# Patient Record
Sex: MALE | Race: WHITE | Employment: OTHER | URBAN - METROPOLITAN AREA
[De-identification: names, ages, dates, MRNs, and addresses within clinical notes are randomized per-mention and may not be internally consistent; named-entity substitution may affect disease eponyms.]

---

## 2017-01-17 PROBLEM — R97.20 ELEVATED PSA: Status: ACTIVE | Noted: 2017-01-17

## 2017-02-13 ENCOUNTER — HOSPITAL ENCOUNTER (OUTPATIENT)
Dept: LAB | Age: 67
Discharge: HOME | End: 2017-02-13

## 2017-02-13 PROCEDURE — 88305 TISSUE EXAM BY PATHOLOGIST: CPT | Performed by: UROLOGY

## 2017-09-13 ENCOUNTER — HOSPITAL ENCOUNTER (EMERGENCY)
Age: 67
Discharge: HOME | End: 2017-09-13
Attending: EMERGENCY MEDICINE
Payer: COMMERCIAL

## 2017-09-13 ENCOUNTER — HOSPITAL ENCOUNTER (OUTPATIENT)
Dept: MRI IMAGING | Age: 67
Discharge: HOME | End: 2017-09-13
Attending: UROLOGY
Payer: COMMERCIAL

## 2017-09-13 VITALS
HEART RATE: 70 BPM | TEMPERATURE: 97.7 F | SYSTOLIC BLOOD PRESSURE: 125 MMHG | RESPIRATION RATE: 20 BRPM | OXYGEN SATURATION: 98 % | DIASTOLIC BLOOD PRESSURE: 73 MMHG | HEIGHT: 70 IN | WEIGHT: 180 LBS | BODY MASS INDEX: 25.77 KG/M2

## 2017-09-13 DIAGNOSIS — R55 NEAR SYNCOPE: Primary | ICD-10-CM

## 2017-09-13 DIAGNOSIS — R97.20 ELEVATED PSA: ICD-10-CM

## 2017-09-13 LAB
ALBUMIN SERPL-MCNC: 3.4 G/DL (ref 3.2–4.6)
ALBUMIN/GLOB SERPL: 0.9 {RATIO} (ref 1.2–3.5)
ALP SERPL-CCNC: 50 U/L (ref 50–136)
ALT SERPL-CCNC: 21 U/L (ref 12–65)
ANION GAP SERPL CALC-SCNC: 5 MMOL/L (ref 7–16)
AST SERPL-CCNC: 17 U/L (ref 15–37)
ATRIAL RATE: 49 BPM
ATRIAL RATE: 51 BPM
BASOPHILS # BLD: 0 K/UL (ref 0–0.2)
BASOPHILS NFR BLD: 0 % (ref 0–2)
BILIRUB SERPL-MCNC: 0.5 MG/DL (ref 0.2–1.1)
BUN SERPL-MCNC: 14 MG/DL (ref 8–23)
CALCIUM SERPL-MCNC: 8.8 MG/DL (ref 8.3–10.4)
CALCULATED P AXIS, ECG09: 65 DEGREES
CALCULATED P AXIS, ECG09: 69 DEGREES
CALCULATED R AXIS, ECG10: 58 DEGREES
CALCULATED R AXIS, ECG10: 63 DEGREES
CALCULATED T AXIS, ECG11: 49 DEGREES
CALCULATED T AXIS, ECG11: 56 DEGREES
CHLORIDE SERPL-SCNC: 105 MMOL/L (ref 98–107)
CO2 SERPL-SCNC: 29 MMOL/L (ref 21–32)
CREAT BLD-MCNC: 0.9 MG/DL (ref 0.8–1.5)
CREAT SERPL-MCNC: 0.86 MG/DL (ref 0.8–1.5)
DIAGNOSIS, 93000: NORMAL
DIAGNOSIS, 93000: NORMAL
DIFFERENTIAL METHOD BLD: ABNORMAL
EOSINOPHIL # BLD: 0.1 K/UL (ref 0–0.8)
EOSINOPHIL NFR BLD: 2 % (ref 0.5–7.8)
ERYTHROCYTE [DISTWIDTH] IN BLOOD BY AUTOMATED COUNT: 13.1 % (ref 11.9–14.6)
GLOBULIN SER CALC-MCNC: 3.9 G/DL (ref 2.3–3.5)
GLUCOSE BLD STRIP.AUTO-MCNC: 80 MG/DL (ref 65–100)
GLUCOSE SERPL-MCNC: 90 MG/DL (ref 65–100)
HCT VFR BLD AUTO: 41.2 % (ref 41.1–50.3)
HGB BLD-MCNC: 14.3 G/DL (ref 13.6–17.2)
IMM GRANULOCYTES # BLD: 0 K/UL (ref 0–0.5)
IMM GRANULOCYTES NFR BLD: 0.2 % (ref 0–5)
LYMPHOCYTES # BLD: 1.7 K/UL (ref 0.5–4.6)
LYMPHOCYTES NFR BLD: 31 % (ref 13–44)
MCH RBC QN AUTO: 31.7 PG (ref 26.1–32.9)
MCHC RBC AUTO-ENTMCNC: 34.7 G/DL (ref 31.4–35)
MCV RBC AUTO: 91.4 FL (ref 79.6–97.8)
MONOCYTES # BLD: 0.3 K/UL (ref 0.1–1.3)
MONOCYTES NFR BLD: 5 % (ref 4–12)
NEUTS SEG # BLD: 3.4 K/UL (ref 1.7–8.2)
NEUTS SEG NFR BLD: 62 % (ref 43–78)
P-R INTERVAL, ECG05: 172 MS
P-R INTERVAL, ECG05: 178 MS
PLATELET # BLD AUTO: 209 K/UL (ref 150–450)
PMV BLD AUTO: 9.1 FL (ref 10.8–14.1)
POTASSIUM SERPL-SCNC: 4.6 MMOL/L (ref 3.5–5.1)
PROT SERPL-MCNC: 7.3 G/DL (ref 6.3–8.2)
Q-T INTERVAL, ECG07: 448 MS
Q-T INTERVAL, ECG07: 472 MS
QRS DURATION, ECG06: 102 MS
QRS DURATION, ECG06: 108 MS
QTC CALCULATION (BEZET), ECG08: 412 MS
QTC CALCULATION (BEZET), ECG08: 426 MS
RBC # BLD AUTO: 4.51 M/UL (ref 4.23–5.67)
SODIUM SERPL-SCNC: 139 MMOL/L (ref 136–145)
TROPONIN I BLD-MCNC: 0 NG/ML (ref 0.02–0.05)
VENTRICULAR RATE, ECG03: 49 BPM
VENTRICULAR RATE, ECG03: 51 BPM
WBC # BLD AUTO: 5.5 K/UL (ref 4.3–11.1)

## 2017-09-13 PROCEDURE — 82962 GLUCOSE BLOOD TEST: CPT

## 2017-09-13 PROCEDURE — 82565 ASSAY OF CREATININE: CPT

## 2017-09-13 PROCEDURE — 93005 ELECTROCARDIOGRAM TRACING: CPT | Performed by: EMERGENCY MEDICINE

## 2017-09-13 PROCEDURE — 93005 ELECTROCARDIOGRAM TRACING: CPT | Performed by: UROLOGY

## 2017-09-13 PROCEDURE — A9577 INJ MULTIHANCE: HCPCS | Performed by: UROLOGY

## 2017-09-13 PROCEDURE — 84484 ASSAY OF TROPONIN QUANT: CPT

## 2017-09-13 PROCEDURE — 99284 EMERGENCY DEPT VISIT MOD MDM: CPT | Performed by: EMERGENCY MEDICINE

## 2017-09-13 PROCEDURE — 80053 COMPREHEN METABOLIC PANEL: CPT | Performed by: EMERGENCY MEDICINE

## 2017-09-13 PROCEDURE — 85025 COMPLETE CBC W/AUTO DIFF WBC: CPT | Performed by: EMERGENCY MEDICINE

## 2017-09-13 PROCEDURE — 74011000258 HC RX REV CODE- 258: Performed by: UROLOGY

## 2017-09-13 PROCEDURE — 74011250636 HC RX REV CODE- 250/636: Performed by: UROLOGY

## 2017-09-13 PROCEDURE — 72197 MRI PELVIS W/O & W/DYE: CPT

## 2017-09-13 RX ORDER — SODIUM CHLORIDE 0.9 % (FLUSH) 0.9 %
10 SYRINGE (ML) INJECTION
Status: COMPLETED | OUTPATIENT
Start: 2017-09-13 | End: 2017-09-13

## 2017-09-13 RX ADMIN — Medication 10 ML: at 09:39

## 2017-09-13 RX ADMIN — SODIUM CHLORIDE 100 ML: 900 INJECTION, SOLUTION INTRAVENOUS at 09:39

## 2017-09-13 RX ADMIN — GADOBENATE DIMEGLUMINE 10 ML: 529 INJECTION, SOLUTION INTRAVENOUS at 09:39

## 2017-09-13 NOTE — ED TRIAGE NOTES
Pt was having an outpatient mri performed, test was completed and pt was sitting in chair. Pt recalls \" I could feel each pulse and my head felt tight\" , MRI staff report syncope and a code was called in MRI,  Upon arrival to MRI, pt was pale and diaphoretic. Pt denied pain. Orthostatics performed, ekg and glucose. Glucose 80.

## 2017-09-13 NOTE — DISCHARGE INSTRUCTIONS
Lightheadedness or Faintness: Care Instructions  Your Care Instructions  Lightheadedness is a feeling that you are about to faint or \"pass out. \" You do not feel as if you or your surroundings are moving. It is different from vertigo, which is the feeling that you or things around you are spinning or tilting. Lightheadedness usually goes away or gets better when you lie down. If lightheadedness gets worse, it can lead to a fainting spell. It is common to feel lightheaded from time to time. Lightheadedness usually is not caused by a serious problem. It often is caused by a short-lasting drop in blood pressure and blood flow to your head that occurs when you get up too quickly from a seated or lying position. Follow-up care is a key part of your treatment and safety. Be sure to make and go to all appointments, and call your doctor if you are having problems. It's also a good idea to know your test results and keep a list of the medicines you take. How can you care for yourself at home? · Lie down for 1 or 2 minutes when you feel lightheaded. After lying down, sit up slowly and remain sitting for 1 to 2 minutes before slowly standing up. · Avoid movements, positions, or activities that have made you lightheaded in the past.  · Get plenty of rest, especially if you have a cold or flu, which can cause lightheadedness. · Make sure you drink plenty of fluids, especially if you have a fever or have been sweating. · Do not drive or put yourself and others in danger while you feel lightheaded. When should you call for help? Call 911 anytime you think you may need emergency care. For example, call if:  · You have symptoms of a stroke. These may include:  ¨ Sudden numbness, tingling, weakness, or loss of movement in your face, arm, or leg, especially on only one side of your body. ¨ Sudden vision changes. ¨ Sudden trouble speaking. ¨ Sudden confusion or trouble understanding simple statements.   ¨ Sudden problems with walking or balance. ¨ A sudden, severe headache that is different from past headaches. · You have symptoms of a heart attack. These may include:  ¨ Chest pain or pressure, or a strange feeling in the chest.  ¨ Sweating. ¨ Shortness of breath. ¨ Nausea or vomiting. ¨ Pain, pressure, or a strange feeling in the back, neck, jaw, or upper belly or in one or both shoulders or arms. ¨ Lightheadedness or sudden weakness. ¨ A fast or irregular heartbeat. After you call 911, the  may tell you to chew 1 adult-strength or 2 to 4 low-dose aspirin. Wait for an ambulance. Do not try to drive yourself. Watch closely for changes in your health, and be sure to contact your doctor if:  · Your lightheadedness gets worse or does not get better with home care. Where can you learn more? Go to http://diogo-jerrell.info/. Enter J620 in the search box to learn more about \"Lightheadedness or Faintness: Care Instructions. \"  Current as of: March 20, 2017  Content Version: 11.3  © 7071-1082 [employer]. Care instructions adapted under license by [employer] (which disclaims liability or warranty for this information). If you have questions about a medical condition or this instruction, always ask your healthcare professional. [address] any warranty or liability for your use of this information.

## 2017-09-13 NOTE — ED PROVIDER NOTES
HPI Comments: Patient is being worked up for an elevated PSA. He was getting a MRI this morning outpatient with contrast and after the procedure while sitting in a chair getting dressed and started to feel lightheaded and weak. He then became poorly responsive with the techs there. Staff reports patient had a syncopal episode but did not fall out of his chair and a code was called and MRI. Patient was pale and diaphoretic with them. Denies any chest pain or shortness of breath. Sent here for further evaluation. Patient feeling better here with rest and fluids. Has not eaten anything this morning. Patient is a 74 y.o. male presenting with syncope. The history is provided by the patient. No  was used. Syncope    This is a new problem. The current episode started less than 1 hour ago. The problem has been resolved. He lost consciousness for a period of less than one minute. The problem is associated with sitting up. Associated symptoms include visual change and light-headedness. Pertinent negatives include no chest pain, no palpitations, no confusion, no fever, no malaise/fatigue, no abdominal pain, no bowel incontinence, no nausea, no vomiting, no bladder incontinence, no congestion, no headaches, no back pain, no focal weakness, no seizures, no slurred speech and no weakness. He has tried nothing for the symptoms. His past medical history is significant for syncope. Past Medical History:   Diagnosis Date    Elevated PSA        Past Surgical History:   Procedure Laterality Date    HX BACK SURGERY           Family History:   Problem Relation Age of Onset    Cancer Paternal Aunt        Social History     Social History    Marital status:      Spouse name: N/A    Number of children: N/A    Years of education: N/A     Occupational History    Not on file.      Social History Main Topics    Smoking status: Never Smoker    Smokeless tobacco: Never Used    Alcohol use No  Drug use: No    Sexual activity: Not on file     Other Topics Concern    Not on file     Social History Narrative         ALLERGIES: Codeine    Review of Systems   Constitutional: Negative for chills, fever and malaise/fatigue. HENT: Negative for congestion, rhinorrhea and sore throat. Eyes: Negative for pain and redness. Respiratory: Negative for chest tightness, shortness of breath and wheezing. Cardiovascular: Positive for syncope. Negative for chest pain, palpitations and leg swelling. Gastrointestinal: Negative for abdominal pain, bowel incontinence, diarrhea, nausea and vomiting. Genitourinary: Negative for bladder incontinence, dysuria and hematuria. Musculoskeletal: Negative for back pain, gait problem, neck pain and neck stiffness. Skin: Negative for color change and rash. Neurological: Positive for syncope and light-headedness. Negative for focal weakness, seizures, weakness, numbness and headaches. Psychiatric/Behavioral: Negative for confusion. Vitals:    09/13/17 1011 09/13/17 1012 09/13/17 1028 09/13/17 1043   BP: 124/66 124/68 127/72 126/73   Pulse: (!) 53 (!) 52 (!) 56 (!) 58   Resp: 12 16 23 19   Temp:       SpO2: 99% 97%     Weight:       Height:                Physical Exam   Constitutional: He is oriented to person, place, and time. He appears well-developed and well-nourished. HENT:   Head: Normocephalic and atraumatic. Neck: Normal range of motion. Neck supple. Cardiovascular: Regular rhythm. Bradycardia present. No murmur heard. Pulmonary/Chest: Effort normal and breath sounds normal. He has no wheezes. Abdominal: Soft. Bowel sounds are normal. There is no tenderness. Musculoskeletal: Normal range of motion. He exhibits no edema. Neurological: He is alert and oriented to person, place, and time. No cranial nerve deficit. He exhibits normal muscle tone. Coordination normal.   Skin: Skin is warm and dry. Nursing note and vitals reviewed. MDM  Number of Diagnoses or Management Options  Diagnosis management comments: Pt feels good. Will discharge. Amount and/or Complexity of Data Reviewed  Clinical lab tests: ordered and reviewed  Tests in the radiology section of CPT®: ordered and reviewed  Tests in the medicine section of CPT®: ordered and reviewed    Patient Progress  Patient progress: stable    ED Course       Procedures      EKG: nonspecific ST and T waves changes, sinus bradycardia. Rate 51.            Results Include:    Recent Results (from the past 24 hour(s))   MRI/CT POC CREATININE    Collection Time: 09/13/17  9:09 AM   Result Value Ref Range    Creatinine (POC) 0.9 0.8 - 1.5 MG/DL    GFRAA, POC >60 >60 ml/min/1.73m2    GFRNA, POC >60 >60 ml/min/1.73m2   EKG, 12 LEAD, INITIAL    Collection Time: 09/13/17  9:53 AM   Result Value Ref Range    Ventricular Rate 49 BPM    Atrial Rate 49 BPM    P-R Interval 172 ms    QRS Duration 108 ms    Q-T Interval 472 ms    QTC Calculation (Bezet) 426 ms    Calculated P Axis 69 degrees    Calculated R Axis 58 degrees    Calculated T Axis 49 degrees    Diagnosis       Marked sinus bradycardia with sinus arrhythmia  Abnormal ECG  When compared with ECG of 16-AUG-2012 14:55,  Left anterior fascicular block is no longer Present  Non-specific change in ST segment in Inferior leads  Nonspecific T wave abnormality no longer evident in Inferior leads  Confirmed by Padmini Yao MD (), SHERON HICKS ([id number]) on 9/13/2017 10:51:54 AM     GLUCOSE, POC    Collection Time: 09/13/17  9:59 AM   Result Value Ref Range    Glucose (POC) 80 65 - 100 mg/dL   EKG, 12 LEAD, INITIAL    Collection Time: 09/13/17 10:13 AM   Result Value Ref Range    Ventricular Rate 51 BPM    Atrial Rate 51 BPM    P-R Interval 178 ms    QRS Duration 102 ms    Q-T Interval 448 ms    QTC Calculation (Bezet) 412 ms    Calculated P Axis 65 degrees    Calculated R Axis 63 degrees    Calculated T Axis 56 degrees    Diagnosis       !! AGE AND GENDER SPECIFIC ECG ANALYSIS !! Sinus bradycardia  Otherwise normal ECG  When compared with ECG of 13-SEP-2017 09:53,  No significant change was found  Confirmed by [facility] SHAILA LANGE (), SHERON HICKS ([id number]) on 9/13/2017 10:52:25 AM     CBC WITH AUTOMATED DIFF    Collection Time: 09/13/17 10:28 AM   Result Value Ref Range    WBC 5.5 4.3 - 11.1 K/uL    RBC 4.51 4.23 - 5.67 M/uL    HGB 14.3 13.6 - 17.2 g/dL    HCT 41.2 41.1 - 50.3 %    MCV 91.4 79.6 - 97.8 FL    MCH 31.7 26.1 - 32.9 PG    MCHC 34.7 31.4 - 35.0 g/dL    RDW 13.1 11.9 - 14.6 %    PLATELET 471 807 - 305 K/uL    MPV 9.1 (L) 10.8 - 14.1 FL    DF AUTOMATED      NEUTROPHILS 62 43 - 78 %    LYMPHOCYTES 31 13 - 44 %    MONOCYTES 5 4.0 - 12.0 %    EOSINOPHILS 2 0.5 - 7.8 %    BASOPHILS 0 0.0 - 2.0 %    IMMATURE GRANULOCYTES 0.2 0.0 - 5.0 %    ABS. NEUTROPHILS 3.4 1.7 - 8.2 K/UL    ABS. LYMPHOCYTES 1.7 0.5 - 4.6 K/UL    ABS. MONOCYTES 0.3 0.1 - 1.3 K/UL    ABS. EOSINOPHILS 0.1 0.0 - 0.8 K/UL    ABS. BASOPHILS 0.0 0.0 - 0.2 K/UL    ABS. IMM. GRANS. 0.0 0.0 - 0.5 K/UL   POC TROPONIN-I    Collection Time: 09/13/17 10:29 AM   Result Value Ref Range    Troponin-I (POC) 0 (L) 0.02 - 9.96 ng/ml   METABOLIC PANEL, COMPREHENSIVE    Collection Time: 09/13/17 11:21 AM   Result Value Ref Range    Sodium 139 136 - 145 mmol/L    Potassium 4.6 3.5 - 5.1 mmol/L    Chloride 105 98 - 107 mmol/L    CO2 29 21 - 32 mmol/L    Anion gap 5 (L) 7 - 16 mmol/L    Glucose 90 65 - 100 mg/dL    BUN 14 8 - 23 MG/DL    Creatinine 0.86 0.8 - 1.5 MG/DL    GFR est AA >60 >60 ml/min/1.73m2    GFR est non-AA >60 >60 ml/min/1.73m2    Calcium 8.8 8.3 - 10.4 MG/DL    Bilirubin, total 0.5 0.2 - 1.1 MG/DL    ALT (SGPT) 21 12 - 65 U/L    AST (SGOT) 17 15 - 37 U/L    Alk.  phosphatase 50 50 - 136 U/L    Protein, total 7.3 6.3 - 8.2 g/dL    Albumin 3.4 3.2 - 4.6 g/dL    Globulin 3.9 (H) 2.3 - 3.5 g/dL    A-G Ratio 0.9 (L) 1.2 - 3.5

## 2017-09-13 NOTE — PROGRESS NOTES
Responded to Code Blue. Patient did not lose pulse nor stop breathing. States he became lightheaded and dizzy while obtaining outpatient MRI for elevated PSA. Drove himself to appointment. Has had allergies to iodine but never has received gadolinium before. Diaphoretic upon arrival but color improved and became asymptomatic during evaluation. Seems to have vasovagaled. sbp in 120s. EKG shows sinus bradycardia into 50s, patient states this isn't new to him. Encouraged to go to ED for observation for a few hours and maybe some IVF.      Gume Sofia MD

## 2017-12-01 PROBLEM — N40.0 BENIGN PROSTATIC HYPERPLASIA WITHOUT LOWER URINARY TRACT SYMPTOMS: Status: ACTIVE | Noted: 2017-12-01

## 2022-03-18 PROBLEM — R97.20 ELEVATED PSA: Status: ACTIVE | Noted: 2017-01-17

## 2022-03-19 PROBLEM — N40.0 BENIGN PROSTATIC HYPERPLASIA WITHOUT LOWER URINARY TRACT SYMPTOMS: Status: ACTIVE | Noted: 2017-12-01

## 2022-06-20 ENCOUNTER — NURSE ONLY (OUTPATIENT)
Dept: UROLOGY | Age: 72
End: 2022-06-20

## 2022-06-20 DIAGNOSIS — R97.20 BPH WITH ELEVATED PSA: Primary | ICD-10-CM

## 2022-06-20 DIAGNOSIS — N40.0 BPH WITH ELEVATED PSA: ICD-10-CM

## 2022-06-20 DIAGNOSIS — R97.20 BPH WITH ELEVATED PSA: ICD-10-CM

## 2022-06-20 DIAGNOSIS — N40.0 BPH WITH ELEVATED PSA: Primary | ICD-10-CM

## 2022-06-21 LAB
PSA FREE MFR SERPL: 18.4 %
PSA FREE SERPL-MCNC: 1.8 NG/ML
PSA SERPL-MCNC: 9.8 NG/ML

## 2022-06-24 ASSESSMENT — ENCOUNTER SYMPTOMS
BACK PAIN: 0
NAUSEA: 0

## 2022-06-24 NOTE — PROGRESS NOTES
[facility]  [address]  [address]  [facility], [address] [facility]  [phone]    Enriqueta Gaffney  : 1950    Chief Complaint   Patient presents with    Follow-up        HPI     Enriqueta Gaffney is a 81 y.o. male with elevated PSA. The result was a PSA level of 6.21 ng/ml. \": PSA was 8.98 when repeated in .     Had prostate biopsy 17. PROSTATE VOLUME: 64.67 gm  PSA 8.98 PSAD 0.14  Pathology: All biopsies were benign.       Total PSA 13.3 with 13.8% free PSA in .      Had MRI pelvis 17: IMPRESSION:  1. Nonspecific enhancing wedge-shaped decreased T2 intensity lateral right mid  gland without diffusion abnormality. No evidence of disease spread beyond the  prostatic capsule. 2. No lymphadenopathy. 3. Decreased T2 signal surrounding each ejaculatory duct orifice is nonspecific  and may represent sequelae of prior infection. 4. BPH centrally.   prostate approx 75 gm by MRI. He had some type of reaction after the IV contrast. Felt warm and passed out. No problems breathing .   PCA3 score was 8. Gives 8% chance of positive biopsy.    PSA 9.2 in -. PSA 7.7 in -18. In -, total PSA 11.4 with 16.6% free PSA.     Gave round of Cipro.    PSA 9.1 in -. PSA is back to level it was in 2016. We discussed doing repeat biopsies w or wo MRI. He doesn't want to have MRI because of the reaction that he had. I don't think had a true contrast allergy.   PSA 8.3 in -. PSA 5.9 in ,  7.2 in . He has no significant voiding complaints.   In -, total PSA 7.4 with 26.4% free PSA.   PSA 9.7 in -. In -, total PSA 9.8 with 18.4% free PSA.     Past Medical History:   Diagnosis Date    Elevated PSA      Past Surgical History:   Procedure Laterality Date    BACK SURGERY       Current Outpatient Medications   Medication Sig Dispense Refill    losartan-hydroCHLOROthiazide (HYZAAR) 100-25 MG per tablet TAKE 1 TABLET BY MOUTH EVERY DAY      rosuvastatin (CRESTOR) 20 MG tablet Take 20 mg by mouth daily       No current facility-administered medications for this visit. Allergies   Allergen Reactions    Codeine Other (See Comments)     Social History     Socioeconomic History    Marital status:      Spouse name: Not on file    Number of children: Not on file    Years of education: Not on file    Highest education level: Not on file   Occupational History    Not on file   Tobacco Use    Smoking status: Never Smoker    Smokeless tobacco: Never Used   Substance and Sexual Activity    Alcohol use: No    Drug use: No    Sexual activity: Not on file   Other Topics Concern    Not on file   Social History Narrative    Not on file     Social Determinants of Health     Financial Resource Strain:     Difficulty of Paying Living Expenses: Not on file   Food Insecurity:     Worried About Running Out of Food in the Last Year: Not on file    Annie of Food in the Last Year: Not on file   Transportation Needs:     Lack of Transportation (Medical): Not on file    Lack of Transportation (Non-Medical):  Not on file   Physical Activity:     Days of Exercise per Week: Not on file    Minutes of Exercise per Session: Not on file   Stress:     Feeling of Stress : Not on file   Social Connections:     Frequency of Communication with Friends and Family: Not on file    Frequency of Social Gatherings with Friends and Family: Not on file    Attends Druze Services: Not on file    Active Member of Clubs or Organizations: Not on file    Attends Club or Organization Meetings: Not on file    Marital Status: Not on file   Intimate Partner Violence:     Fear of Current or Ex-Partner: Not on file    Emotionally Abused: Not on file    Physically Abused: Not on file    Sexually Abused: Not on file   Housing Stability:     Unable to Pay for Housing in the Last Year: Not on file    Number of Places Lived in the Last Year: Not on file    Unstable Housing in the Last Year: Not on file     Family History   Problem Relation Age of Onset    Cancer Paternal Aunt        Review of Systems  Constitutional:   Negative for fever. Cardiovascular:  Negative for chest pain. GI:  Negative for nausea. Genitourinary:  Negative for urinary burning. Musculoskeletal:  Negative for back pain. There were no vitals taken for this visit. Urinalysis  UA - Dipstick  Results for orders placed or performed in visit on 06/27/22   AMB POC URINALYSIS DIP STICK AUTO W/O MICRO   Result Value Ref Range    Color (UA POC)      Clarity (UA POC)      Glucose, Urine, POC Negative Negative    Bilirubin, Urine, POC Negative Negative    KETONES, Urine, POC Negative Negative    Specific Gravity, Urine, POC 1.015 1.001 - 1.035    Blood (UA POC) Trace-intact Negative    pH, Urine, POC 6.0 4.6 - 8.0    Protein, Urine, POC Negative Negative    Urobilinogen, POC Normal     Nitrite, Urine, POC Negative Negative    Leukocyte Esterase, Urine, POC Negative Negative       UA - Micro  WBC - 0  RBC - 0  Bacteria - 0  Epith - 0    Physical Exam    Assessment and Plan    ICD-10-CM    1. BPH with elevated PSA  N40.0 AMB POC URINALYSIS DIP STICK AUTO W/O MICRO    R97.20 PSA, Total and Free       Orders Placed This Encounter   Procedures    PSA, Total and Free     Standing Status:   Future     Standing Expiration Date:   6/27/2023    AMB POC URINALYSIS DIP STICK AUTO W/O MICRO     PSA elevated , but stable over last 6 months. Won't consent to MRI because of previous reaction. Will follow PSA. Follow-up and Dispositions    · Return in about 6 months (around 12/27/2022) for PSA II.     YOBANI then.

## 2022-06-27 ENCOUNTER — OFFICE VISIT (OUTPATIENT)
Dept: UROLOGY | Age: 72
End: 2022-06-27
Payer: COMMERCIAL

## 2022-06-27 DIAGNOSIS — N40.0 BPH WITH ELEVATED PSA: Primary | ICD-10-CM

## 2022-06-27 DIAGNOSIS — R97.20 BPH WITH ELEVATED PSA: Primary | ICD-10-CM

## 2022-06-27 LAB
BILIRUBIN, URINE, POC: NEGATIVE
BLOOD URINE, POC: NORMAL
GLUCOSE URINE, POC: NEGATIVE
KETONES, URINE, POC: NEGATIVE
LEUKOCYTE ESTERASE, URINE, POC: NEGATIVE
NITRITE, URINE, POC: NEGATIVE
PH, URINE, POC: 6 (ref 4.6–8)
PROTEIN,URINE, POC: NEGATIVE
SPECIFIC GRAVITY, URINE, POC: 1.01 (ref 1–1.03)
URINALYSIS CLARITY, POC: NORMAL
URINALYSIS COLOR, POC: NORMAL
UROBILINOGEN, POC: NORMAL

## 2022-06-27 PROCEDURE — 99213 OFFICE O/P EST LOW 20 MIN: CPT | Performed by: UROLOGY

## 2022-06-27 PROCEDURE — 81003 URINALYSIS AUTO W/O SCOPE: CPT | Performed by: UROLOGY

## 2022-06-27 PROCEDURE — 1123F ACP DISCUSS/DSCN MKR DOCD: CPT | Performed by: UROLOGY

## 2022-12-23 DIAGNOSIS — R97.20 BPH WITH ELEVATED PSA: ICD-10-CM

## 2022-12-23 DIAGNOSIS — N40.0 BPH WITH ELEVATED PSA: ICD-10-CM

## 2022-12-26 LAB
PSA FREE MFR SERPL: 24 %
PSA FREE SERPL-MCNC: 3.6 NG/ML
PSA SERPL-MCNC: 15 NG/ML

## 2022-12-30 ENCOUNTER — OFFICE VISIT (OUTPATIENT)
Dept: UROLOGY | Age: 72
End: 2022-12-30

## 2022-12-30 DIAGNOSIS — R97.20 BPH WITH ELEVATED PSA: Primary | ICD-10-CM

## 2022-12-30 DIAGNOSIS — N40.0 BPH WITH ELEVATED PSA: Primary | ICD-10-CM

## 2022-12-30 LAB
BILIRUBIN, URINE, POC: NEGATIVE
BLOOD URINE, POC: NORMAL
GLUCOSE URINE, POC: NEGATIVE
KETONES, URINE, POC: NEGATIVE
LEUKOCYTE ESTERASE, URINE, POC: NEGATIVE
NITRITE, URINE, POC: NEGATIVE
PH, URINE, POC: 7 (ref 4.6–8)
PROTEIN,URINE, POC: NEGATIVE
SPECIFIC GRAVITY, URINE, POC: 1.01 (ref 1–1.03)
URINALYSIS CLARITY, POC: NORMAL
URINALYSIS COLOR, POC: NORMAL
UROBILINOGEN, POC: NORMAL

## 2022-12-30 RX ORDER — MELOXICAM 15 MG/1
15 TABLET ORAL DAILY
COMMUNITY
Start: 2022-01-15 | End: 2023-05-09

## 2022-12-30 ASSESSMENT — ENCOUNTER SYMPTOMS
BACK PAIN: 0
ABDOMINAL PAIN: 0

## 2022-12-30 NOTE — PROGRESS NOTES
[facility]  [address]  [address] [facility] [address], [address] [facility]  [phone]    Isac Brewer  : 1950    Chief Complaint   Patient presents with    Follow-up     BPH with Elevated PSA        HPI     Isac Brewer is a 81 y.o. male with elevated PSA. The result was a PSA level of 6.21 ng/ml. \": PSA was 8.98 when repeated in . Had prostate biopsy 17. PROSTATE VOLUME: 64.67 gm  PSA 8.98 PSAD 0.14  Pathology: All biopsies were benign. Total PSA 13.3 with 13.8% free PSA in . Had MRI pelvis 17: IMPRESSION:  1. Nonspecific enhancing wedge-shaped decreased T2 intensity lateral right mid  gland without diffusion abnormality. No evidence of disease spread beyond the  prostatic capsule. 2. No lymphadenopathy. 3. Decreased T2 signal surrounding each ejaculatory duct orifice is nonspecific  and may represent sequelae of prior infection. 4. BPH centrally. prostate approx 75 gm by MRI. He had some type of reaction after the IV contrast. Felt warm and passed out. No problems breathing . PCA3 score was 8. Gives 8% chance of positive biopsy. PSA 9.2 in -. PSA 7.7 in -. In , total PSA 11.4 with 16.6% free PSA. Gave round of Cipro. PSA 9.1 in -. PSA is back to level it was in 2016. We discussed doing repeat biopsies w or wo MRI. He doesn't want to have MRI because of the reaction that he had. I don't think had a true contrast allergy. PSA 8.3 in -. PSA 5.9 in ,  7.2 in . He has no significant voiding complaints. In , total PSA 7.4 with 26.4% free PSA. PSA 9.7 in . In , total PSA 9.8 with 18.4% free PSA. PSA elevated , but stable over last 6 months. Won't consent to MRI because of previous reaction. In , total PSA 15.0, 24% free PSA.    Past Medical History:   Diagnosis Date    Elevated PSA      Past Surgical History:   Procedure Laterality Date    BACK SURGERY       Current POC 7.0 4.6 - 8.0    Protein, Urine, POC Negative Negative    Urobilinogen, POC 0.2 mg/dL     Nitrite, Urine, POC Negative Negative    Leukocyte Esterase, Urine, POC Negative Negative       UA - Micro  WBC - 0  RBC - 0  Bacteria - 0  Epith - 0    Physical Exam    Assessment and Plan    ICD-10-CM    1. BPH with elevated PSA  N40.0 AMB POC URINALYSIS DIP STICK AUTO W/O MICRO    R97.20 MRI PELVIS W WO CONTRAST          Orders Placed This Encounter   Procedures    MRI PELVIS W WO CONTRAST     Standing Status:   Future     Standing Expiration Date:   12/30/2023     Order Specific Question:   STAT Creatinine as needed:     Answer:   No     Order Specific Question:   Reason for exam:     Answer:   elelvated PSA    AMB POC URINALYSIS DIP STICK AUTO W/O MICRO   Rising PSA.,   Will get repeat MRI. He had a syncopal episode with MRI in the past, but it was not a true contrast allergy. Follow-up and Dispositions    Return for call patient to arrange follow-up.

## 2023-01-05 ENCOUNTER — HOSPITAL ENCOUNTER (OUTPATIENT)
Dept: MRI IMAGING | Age: 73
Discharge: HOME | End: 2023-01-05
Payer: COMMERCIAL

## 2023-01-05 DIAGNOSIS — R97.20 BPH WITH ELEVATED PSA: ICD-10-CM

## 2023-01-05 DIAGNOSIS — N40.0 BPH WITH ELEVATED PSA: ICD-10-CM

## 2023-01-05 PROCEDURE — 6360000004 HC RX CONTRAST MEDICATION: Performed by: UROLOGY

## 2023-01-05 PROCEDURE — A9579 GAD-BASE MR CONTRAST NOS,1ML: HCPCS | Performed by: UROLOGY

## 2023-01-05 PROCEDURE — 72197 MRI PELVIS W/O & W/DYE: CPT

## 2023-01-05 RX ADMIN — GADOTERIDOL 15 ML: 279.3 INJECTION, SOLUTION INTRAVENOUS at 07:39

## 2023-01-11 ENCOUNTER — TELEPHONE (OUTPATIENT)
Dept: UROLOGY | Age: 73
End: 2023-01-11

## 2023-05-15 ENCOUNTER — NURSE ONLY (OUTPATIENT)
Dept: UROLOGY | Age: 73
End: 2023-05-15

## 2023-05-15 DIAGNOSIS — N40.0 BPH WITH ELEVATED PSA: ICD-10-CM

## 2023-05-15 DIAGNOSIS — R97.20 BPH WITH ELEVATED PSA: ICD-10-CM

## 2023-05-16 LAB — PSA SERPL-MCNC: 12.8 NG/ML

## 2023-05-27 ENCOUNTER — TELEPHONE (OUTPATIENT)
Dept: UROLOGY | Age: 73
End: 2023-05-27

## 2023-11-24 ENCOUNTER — NURSE ONLY (OUTPATIENT)
Dept: UROLOGY | Age: 73
End: 2023-11-24

## 2023-11-24 DIAGNOSIS — N40.0 BPH WITH ELEVATED PSA: Primary | ICD-10-CM

## 2023-11-24 DIAGNOSIS — R97.20 BPH WITH ELEVATED PSA: Primary | ICD-10-CM

## 2023-11-24 LAB — PSA SERPL-MCNC: 19.7 NG/ML

## 2023-12-01 ENCOUNTER — OFFICE VISIT (OUTPATIENT)
Dept: UROLOGY | Age: 73
End: 2023-12-01
Payer: COMMERCIAL

## 2023-12-01 DIAGNOSIS — N40.0 BPH WITH ELEVATED PSA: Primary | ICD-10-CM

## 2023-12-01 DIAGNOSIS — R97.20 BPH WITH ELEVATED PSA: Primary | ICD-10-CM

## 2023-12-01 DIAGNOSIS — R97.20 ELEVATED PROSTATE SPECIFIC ANTIGEN (PSA): ICD-10-CM

## 2023-12-01 LAB
BILIRUBIN, URINE, POC: NEGATIVE
BLOOD URINE, POC: NORMAL
GLUCOSE URINE, POC: NEGATIVE
KETONES, URINE, POC: NEGATIVE
LEUKOCYTE ESTERASE, URINE, POC: NEGATIVE
NITRITE, URINE, POC: NEGATIVE
PH, URINE, POC: 5.5 (ref 4.6–8)
PROTEIN,URINE, POC: NEGATIVE
SPECIFIC GRAVITY, URINE, POC: 1.02 (ref 1–1.03)
URINALYSIS CLARITY, POC: NORMAL
URINALYSIS COLOR, POC: NORMAL
UROBILINOGEN, POC: NORMAL

## 2023-12-01 PROCEDURE — 99214 OFFICE O/P EST MOD 30 MIN: CPT | Performed by: UROLOGY

## 2023-12-01 PROCEDURE — 81003 URINALYSIS AUTO W/O SCOPE: CPT | Performed by: UROLOGY

## 2023-12-01 PROCEDURE — 1123F ACP DISCUSS/DSCN MKR DOCD: CPT | Performed by: UROLOGY

## 2023-12-01 ASSESSMENT — ENCOUNTER SYMPTOMS
BACK PAIN: 0
NAUSEA: 0

## 2023-12-01 NOTE — PROGRESS NOTES
[facility]  [address]  [phone]    Francis Oconnor  : 1950    Chief Complaint   Patient presents with    Follow-up        HPI     Francis Oconnor is a 72 y.o. male   with elevated PSA. The result was a PSA level of 6.21 ng/ml. \": PSA was 8.98 when repeated in . Had prostate biopsy 17. PROSTATE VOLUME: 64.67 gm  PSA 8.98 PSAD 0.14  Pathology: All biopsies were benign. Total PSA 13.3 with 13.8% free PSA in . Had MRI pelvis 17: IMPRESSION:  1. Nonspecific enhancing wedge-shaped decreased T2 intensity lateral right mid  gland without diffusion abnormality. No evidence of disease spread beyond the  prostatic capsule. 2. No lymphadenopathy. 3. Decreased T2 signal surrounding each ejaculatory duct orifice is nonspecific  and may represent sequelae of prior infection. 4. BPH centrally. prostate approx 75 gm by MRI. He had some type of reaction after the IV contrast. Felt warm and passed out. No problems breathing . PCA3 score was 8. Gives 8% chance of positive biopsy. PSA 9.2 in -. PSA 7.7 in . In , total PSA 11.4 with 16.6% free PSA. Gave round of Cipro. PSA 9.1 in . PSA is back to level it was in 2016. We discussed doing repeat biopsies w or wo MRI. He doesn't want to have MRI because of the reaction that he had. I don't think had a true contrast allergy. PSA 8.3 in . PSA 5.9 in ,  7.2 in . He has no significant voiding complaints. In , total PSA 7.4 with 26.4% free PSA. PSA 9.7 in . In , total PSA 9.8 with 18.4% free PSA. PSA elevated , but stable over last 6 months. In , total PSA 15.0, 24% free PSA. MRI in :   Findings:   PROSTATE SIZE: The gland measures 5.9 x 5.5 x 5.4 cm not significantly changed   from previous examination. Wendy Brown        PERIPHERAL GLAND: The previously noted right peripheral zone wedge T2

## 2023-12-13 ENCOUNTER — HOSPITAL ENCOUNTER (OUTPATIENT)
Dept: MRI IMAGING | Age: 73
Discharge: HOME | End: 2023-12-16
Attending: UROLOGY
Payer: COMMERCIAL

## 2023-12-13 DIAGNOSIS — R97.20 ELEVATED PROSTATE SPECIFIC ANTIGEN (PSA): ICD-10-CM

## 2023-12-13 PROCEDURE — A9579 GAD-BASE MR CONTRAST NOS,1ML: HCPCS | Performed by: UROLOGY

## 2023-12-13 PROCEDURE — 6360000004 HC RX CONTRAST MEDICATION: Performed by: UROLOGY

## 2023-12-13 PROCEDURE — 72197 MRI PELVIS W/O & W/DYE: CPT

## 2023-12-13 RX ADMIN — GADOTERIDOL 15 ML: 279.3 INJECTION, SOLUTION INTRAVENOUS at 09:34

## 2023-12-26 ENCOUNTER — TELEPHONE (OUTPATIENT)
Dept: UROLOGY | Age: 73
End: 2023-12-26

## 2023-12-26 ENCOUNTER — PREP FOR PROCEDURE (OUTPATIENT)
Dept: UROLOGY | Age: 73
End: 2023-12-26

## 2023-12-26 DIAGNOSIS — R97.20 ELEVATED PROSTATE SPECIFIC ANTIGEN (PSA): Primary | ICD-10-CM

## 2023-12-26 RX ORDER — CIPROFLOXACIN 500 MG/1
500 TABLET, FILM COATED ORAL 2 TIMES DAILY
Qty: 6 TABLET | Refills: 0 | Status: SHIPPED | OUTPATIENT
Start: 2023-12-26 | End: 2023-12-29

## 2023-12-26 NOTE — TELEPHONE ENCOUNTER
Called pt about MRI  Schedule MRI fusion transrectal prostate biopsy .     Diagnosis Orders   1. Elevated prostate specific antigen (PSA)  ciprofloxacin (CIPRO) 500 MG tablet        I eprescribed the med  Needs cipro and enema .did orders        INFORMED CONSENT: The nature of the needle biopsy and the ultrasound and the  purpose was discussed with the patient.  Risks include, but are not limited tobleeding into the urethra, bladder or rectum, infection, local pain, difficulty voiding and urinary retention requiring catheterization, inability to get an adequate amount of specimen for diagnosis, false positives and negatives, the need for further testing or open procedures based on biopsy results, and side effects from local anesthesia.  The benefits are judged to outweigh the risks, should any of these complications occur, and no guarantees of success or outcome were given or implied. He states he has no further questions and agreed to proceed.

## 2024-01-09 NOTE — TELEPHONE ENCOUNTER
Procedures: Procedure(s):   MRI FUSION PROSTATE BIOPSY   Date: 2/15/2024   Time: 1200   Location: [facility] MAIN OR 02     Scheduled.

## 2024-02-13 RX ORDER — ACETAMINOPHEN 500 MG
500 TABLET ORAL EVERY 6 HOURS PRN
COMMUNITY

## 2024-02-13 NOTE — PERIOP NOTE
Phone pre-assessment completed.    Verified name&  . Order to obtain consent found in EHR & matches case posting.    Type 1A surgery,  assessment complete.  Orders  received.    Labs per surgeon: UA on arrival to preop  Labs per anesthesia protocol: none    Patient answered medical/surgical history questions at their best of ability. All prior to admission medications documented in EPIC.    Patient instructed to continue all prescribed medications unless otherwise instructed below:    Prescription meds to hold:none    Patient instructed to take ONLY the following medications the day of surgery according to anesthesia guidelines with a small sip of water: tylenol (see below)     If you have never been diagnosed with liver disease, take Acetaminophen 1000mg in the morning and then again before bed one day prior to surgery date. If your surgery is 12 noon or after please also  take morning of surgery.    VERBALIZES UNDERSTANDING TO HOLD ALL VITAMINS AND SUPPLEMENTS and NSAIDS (aspirin, naproxen, ibuprofen) IMMEDIATELY PER ANESTHESIA PROTOCOL.    Instructed on the following:    > Arrive at [address] [place] [address] (enter at front entrance by statue  [place])   Entrance, time of arrival to be called the day before by 1700  > NPO after midnight including gum, mints, and ice chips  > Responsible adult must drive patient to the hospital, stay during surgery, and patient will need supervision 24 hours after anesthesia  > Use antibacterial soap in shower the night before surgery and on the morning of surgery  > All piercings must be removed prior to arrival.    > Leave all valuables (money and jewelry) at home but bring insurance card and ID on DOS.   > You may be required to pay a deductible or co-pay on the day of your procedure. You can pre-pay by calling [phone] if your surgery is at the [facility] or [phone] if your surgery is at the [facility].  > Do not wear make-up, nail polish, lotions,  cologne, perfumes, powders, or oil on skin. Artificial nails are not permitted.   Please bring the following that apply: CPAP or Bi-Pap, portable oxygen, rescue inhalers, remote for spinal cord stimulator or any electronic remote implant, and post-operative supplies such as cold therapy pad, sling, brace, shoe or boot as it applies to your case.    Teach back successful and demonstrates knowledge of instruction.    You will received a call from the pre-op nurse by 5 pm on the business day prior to the scheduled procedure. If you have not spoken with a nurse, please check your voicemail. If you have not received an arrival time by 5 pm, please call [phone] (main) [phone] (outpatient).

## 2024-02-14 ENCOUNTER — ANESTHESIA EVENT (OUTPATIENT)
Dept: SURGERY | Age: 74
End: 2024-02-14
Payer: COMMERCIAL

## 2024-02-15 ENCOUNTER — HOSPITAL ENCOUNTER (OUTPATIENT)
Age: 74
Setting detail: OUTPATIENT SURGERY
Discharge: HOME | End: 2024-02-15
Attending: UROLOGY | Admitting: UROLOGY
Payer: COMMERCIAL

## 2024-02-15 ENCOUNTER — ANESTHESIA (OUTPATIENT)
Dept: SURGERY | Age: 74
End: 2024-02-15
Payer: COMMERCIAL

## 2024-02-15 VITALS
OXYGEN SATURATION: 99 % | HEART RATE: 64 BPM | DIASTOLIC BLOOD PRESSURE: 73 MMHG | WEIGHT: 181 LBS | SYSTOLIC BLOOD PRESSURE: 130 MMHG | HEIGHT: 70 IN | RESPIRATION RATE: 18 BRPM | BODY MASS INDEX: 25.91 KG/M2 | TEMPERATURE: 97.1 F

## 2024-02-15 LAB
APPEARANCE UR: CLEAR
BACTERIA URNS QL MICRO: NEGATIVE /HPF
BILIRUB UR QL: NEGATIVE
CASTS URNS QL MICRO: ABNORMAL /LPF
COLOR UR: ABNORMAL
EPI CELLS #/AREA URNS HPF: ABNORMAL /HPF
GLUCOSE UR STRIP.AUTO-MCNC: NEGATIVE MG/DL
HGB UR QL STRIP: ABNORMAL
KETONES UR QL STRIP.AUTO: ABNORMAL MG/DL
LEUKOCYTE ESTERASE UR QL STRIP.AUTO: NEGATIVE
NITRITE UR QL STRIP.AUTO: NEGATIVE
PH UR STRIP: 5 (ref 5–9)
POTASSIUM BLD-SCNC: 3.7 MMOL/L (ref 3.5–5.1)
PROT UR STRIP-MCNC: ABNORMAL MG/DL
RBC #/AREA URNS HPF: ABNORMAL /HPF
SP GR UR REFRACTOMETRY: 1.03 (ref 1–1.02)
UROBILINOGEN UR QL STRIP.AUTO: 1 EU/DL (ref 0.2–1)
WBC URNS QL MICRO: ABNORMAL /HPF

## 2024-02-15 PROCEDURE — 3600000002 HC SURGERY LEVEL 2 BASE: Performed by: UROLOGY

## 2024-02-15 PROCEDURE — 6360000002 HC RX W HCPCS: Performed by: NURSE ANESTHETIST, CERTIFIED REGISTERED

## 2024-02-15 PROCEDURE — 2500000003 HC RX 250 WO HCPCS: Performed by: NURSE ANESTHETIST, CERTIFIED REGISTERED

## 2024-02-15 PROCEDURE — 81001 URINALYSIS AUTO W/SCOPE: CPT

## 2024-02-15 PROCEDURE — 84132 ASSAY OF SERUM POTASSIUM: CPT

## 2024-02-15 PROCEDURE — 2709999900 HC NON-CHARGEABLE SUPPLY: Performed by: UROLOGY

## 2024-02-15 PROCEDURE — 6370000000 HC RX 637 (ALT 250 FOR IP): Performed by: ANESTHESIOLOGY

## 2024-02-15 PROCEDURE — 55700 PR PROSTATE NEEDLE BIOPSY ANY APPROACH: CPT | Performed by: UROLOGY

## 2024-02-15 PROCEDURE — 7100000001 HC PACU RECOVERY - ADDTL 15 MIN: Performed by: UROLOGY

## 2024-02-15 PROCEDURE — 3600000012 HC SURGERY LEVEL 2 ADDTL 15MIN: Performed by: UROLOGY

## 2024-02-15 PROCEDURE — 2580000003 HC RX 258: Performed by: UROLOGY

## 2024-02-15 PROCEDURE — 7100000000 HC PACU RECOVERY - FIRST 15 MIN: Performed by: UROLOGY

## 2024-02-15 PROCEDURE — 88305 TISSUE EXAM BY PATHOLOGIST: CPT

## 2024-02-15 PROCEDURE — 3700000000 HC ANESTHESIA ATTENDED CARE: Performed by: UROLOGY

## 2024-02-15 PROCEDURE — 2500000003 HC RX 250 WO HCPCS: Performed by: REGISTERED NURSE

## 2024-02-15 PROCEDURE — 3700000001 HC ADD 15 MINUTES (ANESTHESIA): Performed by: UROLOGY

## 2024-02-15 PROCEDURE — 2580000003 HC RX 258: Performed by: ANESTHESIOLOGY

## 2024-02-15 PROCEDURE — 6360000002 HC RX W HCPCS: Performed by: UROLOGY

## 2024-02-15 PROCEDURE — 7100000010 HC PHASE II RECOVERY - FIRST 15 MIN: Performed by: UROLOGY

## 2024-02-15 PROCEDURE — 76942 ECHO GUIDE FOR BIOPSY: CPT | Performed by: UROLOGY

## 2024-02-15 RX ORDER — HYDROMORPHONE HYDROCHLORIDE 2 MG/ML
0.5 INJECTION, SOLUTION INTRAMUSCULAR; INTRAVENOUS; SUBCUTANEOUS EVERY 5 MIN PRN
Status: DISCONTINUED | OUTPATIENT
Start: 2024-02-15 | End: 2024-02-15 | Stop reason: HOSPADM

## 2024-02-15 RX ORDER — SODIUM CHLORIDE 0.9 % (FLUSH) 0.9 %
5-40 SYRINGE (ML) INJECTION EVERY 12 HOURS SCHEDULED
Status: DISCONTINUED | OUTPATIENT
Start: 2024-02-15 | End: 2024-02-15 | Stop reason: HOSPADM

## 2024-02-15 RX ORDER — EPHEDRINE SULFATE/0.9% NACL/PF 50 MG/5 ML
SYRINGE (ML) INTRAVENOUS PRN
Status: DISCONTINUED | OUTPATIENT
Start: 2024-02-15 | End: 2024-02-15 | Stop reason: SDUPTHER

## 2024-02-15 RX ORDER — SODIUM CHLORIDE 0.9 % (FLUSH) 0.9 %
5-40 SYRINGE (ML) INJECTION PRN
Status: DISCONTINUED | OUTPATIENT
Start: 2024-02-15 | End: 2024-02-15 | Stop reason: HOSPADM

## 2024-02-15 RX ORDER — PROPOFOL 10 MG/ML
INJECTION, EMULSION INTRAVENOUS PRN
Status: DISCONTINUED | OUTPATIENT
Start: 2024-02-15 | End: 2024-02-15 | Stop reason: SDUPTHER

## 2024-02-15 RX ORDER — OXYCODONE HYDROCHLORIDE 5 MG/1
5 TABLET ORAL PRN
Status: COMPLETED | OUTPATIENT
Start: 2024-02-15 | End: 2024-02-15

## 2024-02-15 RX ORDER — LIDOCAINE HYDROCHLORIDE 20 MG/ML
INJECTION, SOLUTION EPIDURAL; INFILTRATION; INTRACAUDAL; PERINEURAL PRN
Status: DISCONTINUED | OUTPATIENT
Start: 2024-02-15 | End: 2024-02-15 | Stop reason: SDUPTHER

## 2024-02-15 RX ORDER — OXYCODONE HYDROCHLORIDE 5 MG/1
10 TABLET ORAL PRN
Status: COMPLETED | OUTPATIENT
Start: 2024-02-15 | End: 2024-02-15

## 2024-02-15 RX ORDER — SODIUM CHLORIDE 9 MG/ML
INJECTION, SOLUTION INTRAVENOUS PRN
Status: DISCONTINUED | OUTPATIENT
Start: 2024-02-15 | End: 2024-02-15 | Stop reason: HOSPADM

## 2024-02-15 RX ORDER — ACETAMINOPHEN 500 MG
1000 TABLET ORAL ONCE
Status: DISCONTINUED | OUTPATIENT
Start: 2024-02-15 | End: 2024-02-15 | Stop reason: HOSPADM

## 2024-02-15 RX ORDER — LIDOCAINE HYDROCHLORIDE 10 MG/ML
1 INJECTION, SOLUTION INFILTRATION; PERINEURAL
Status: DISCONTINUED | OUTPATIENT
Start: 2024-02-15 | End: 2024-02-15 | Stop reason: HOSPADM

## 2024-02-15 RX ORDER — SODIUM CHLORIDE, SODIUM LACTATE, POTASSIUM CHLORIDE, CALCIUM CHLORIDE 600; 310; 30; 20 MG/100ML; MG/100ML; MG/100ML; MG/100ML
INJECTION, SOLUTION INTRAVENOUS CONTINUOUS
Status: DISCONTINUED | OUTPATIENT
Start: 2024-02-15 | End: 2024-02-15 | Stop reason: HOSPADM

## 2024-02-15 RX ORDER — HYDROMORPHONE HYDROCHLORIDE 2 MG/ML
0.25 INJECTION, SOLUTION INTRAMUSCULAR; INTRAVENOUS; SUBCUTANEOUS EVERY 5 MIN PRN
Status: DISCONTINUED | OUTPATIENT
Start: 2024-02-15 | End: 2024-02-15 | Stop reason: HOSPADM

## 2024-02-15 RX ORDER — PROCHLORPERAZINE EDISYLATE 5 MG/ML
5 INJECTION INTRAMUSCULAR; INTRAVENOUS
Status: DISCONTINUED | OUTPATIENT
Start: 2024-02-15 | End: 2024-02-15 | Stop reason: HOSPADM

## 2024-02-15 RX ORDER — ONDANSETRON 2 MG/ML
4 INJECTION INTRAMUSCULAR; INTRAVENOUS
Status: DISCONTINUED | OUTPATIENT
Start: 2024-02-15 | End: 2024-02-15 | Stop reason: HOSPADM

## 2024-02-15 RX ORDER — MIDAZOLAM HYDROCHLORIDE 2 MG/2ML
2 INJECTION, SOLUTION INTRAMUSCULAR; INTRAVENOUS
Status: DISCONTINUED | OUTPATIENT
Start: 2024-02-15 | End: 2024-02-15 | Stop reason: HOSPADM

## 2024-02-15 RX ADMIN — LIDOCAINE HYDROCHLORIDE 50 MG: 20 INJECTION, SOLUTION EPIDURAL; INFILTRATION; INTRACAUDAL; PERINEURAL at 11:32

## 2024-02-15 RX ADMIN — OXYCODONE HYDROCHLORIDE 5 MG: 5 TABLET ORAL at 12:21

## 2024-02-15 RX ADMIN — PROPOFOL 50 MG: 10 INJECTION, EMULSION INTRAVENOUS at 11:32

## 2024-02-15 RX ADMIN — Medication 10 MG: at 11:39

## 2024-02-15 RX ADMIN — PROPOFOL 200 MCG/KG/MIN: 10 INJECTION, EMULSION INTRAVENOUS at 11:33

## 2024-02-15 RX ADMIN — SODIUM CHLORIDE, POTASSIUM CHLORIDE, SODIUM LACTATE AND CALCIUM CHLORIDE: 600; 310; 30; 20 INJECTION, SOLUTION INTRAVENOUS at 10:44

## 2024-02-15 RX ADMIN — PROPOFOL 50 MG: 10 INJECTION, EMULSION INTRAVENOUS at 11:37

## 2024-02-15 RX ADMIN — WATER 1000 MG: 1 INJECTION INTRAMUSCULAR; INTRAVENOUS; SUBCUTANEOUS at 11:36

## 2024-02-15 ASSESSMENT — PAIN - FUNCTIONAL ASSESSMENT: PAIN_FUNCTIONAL_ASSESSMENT: 0-10

## 2024-02-15 ASSESSMENT — PAIN SCALES - GENERAL: PAINLEVEL_OUTOF10: 5

## 2024-02-15 ASSESSMENT — PAIN DESCRIPTION - LOCATION: LOCATION: BUTTOCKS

## 2024-02-15 NOTE — ANESTHESIA PRE PROCEDURE
Department of Anesthesiology  Preprocedure Note       Name:  Julio C Tipton   Age:  74 y.o.  :  1950                                          MRN:  [id number]         Date:  2/15/2024      Surgeon: Surgeon(s):  Bryant Keane Jr., MD    Procedure: Procedure(s):  MRI FUSION PROSTATE BIOPSY    Medications prior to admission:   Prior to Admission medications    Medication Sig Start Date End Date Taking? Authorizing Provider   acetaminophen (TYLENOL) 500 MG tablet Take 1 tablet by mouth every 6 hours as needed for Pain   Yes Provider, MD Israel   meloxicam (MOBIC) 15 MG tablet Take 1 tablet by mouth daily PRN 1/15/22 5/9/23  Provider, MD Israel   losartan-hydroCHLOROthiazide (HYZAAR) 100-25 MG per tablet Take 0.5 tablets by mouth daily TAKE 1 TABLET BY MOUTH EVERY DAY 10/3/20   Automatic Reconciliation, Ar       Current medications:    Current Facility-Administered Medications   Medication Dose Route Frequency Provider Last Rate Last Admin   • lidocaine 1 % injection 1 mL  1 mL IntraDERmal Once PRN MADHAVI Washburn MD       • acetaminophen (TYLENOL) tablet 1,000 mg  1,000 mg Oral Once MADHAVI Washburn MD       • lactated ringers IV soln infusion   IntraVENous Continuous MADHAVI Washburn MD       • sodium chloride flush 0.9 % injection 5-40 mL  5-40 mL IntraVENous 2 times per day MADHAVI Washburn MD       • sodium chloride flush 0.9 % injection 5-40 mL  5-40 mL IntraVENous PRN MADHAVI Washburn MD       • 0.9 % sodium chloride infusion   IntraVENous PRN MADHAVI Washburn MD       • midazolam PF (VERSED) injection 2 mg  2 mg IntraVENous Once PRN MADHAVI Washburn MD       • cefTRIAXone (ROCEPHIN) 1,000 mg in sterile water 10 mL IV syringe  1,000 mg IntraVENous On Call to OR Bryant Keane Jr., MD           Allergies:    Allergies   Allergen Reactions   • Codeine Itching       Problem List:    Patient Active Problem List   Diagnosis Code   • Elevated PSA R97.20   • Benign prostatic

## 2024-02-15 NOTE — H&P
at the bilateral seminal vesicles orifice and unchanged from previous   examination suggesting sequelae of chronic inflammation. No new focal nodule of   T2 signal or ADC abnormality within the peripheral zone.       CENTRAL GLAND: There are T2 hyperintense nodules in the central gland consistent   with hypertrophy.       CAPSULE: The prostatic capsule is maintained without evidence of metastatic   spread.        PERINEURAL: The neurovascular bundles and posterolateral periprostatic fat are   normal without evidence of tumor extension.         SEMINAL VESICLES: The seminal vesicles and ejaculatory ducts are normal.        LYMPH NODES: No pelvic lymph adenopathy.  Limited imaging of the lower abdomen   shows no adenopathy or free fluid.       BONES: No aggressive osseous lesion.       BLADDER AND RECTUM: The bladder is unremarkable. Rectum unremarkable.           Impression   1. No discernible lesion seen. The capsule is unremarkable.   2. No lymphadenopathy.   Prostate approx 88 gm by MRI. He tolerated the MRI well.   YOBANI in 12-22 showed prostate 1+, right>left, no nodules.  In 4-23, total PSA 19.5, 14.4% free PSA. Gave course of Cipro then .  PSA 12.8 in 5-23, 19.7 in 11-23.     Past Medical History        Past Medical History:   Diagnosis Date    Elevated PSA           Past Surgical History         Past Surgical History:   Procedure Laterality Date    BACK SURGERY             Current Facility-Administered Medications          Current Outpatient Medications   Medication Sig Dispense Refill    meloxicam (MOBIC) 15 MG tablet Take 1 tablet by mouth daily PRN        losartan-hydroCHLOROthiazide (HYZAAR) 100-25 MG per tablet TAKE 1 TABLET BY MOUTH EVERY DAY        rosuvastatin (CRESTOR) 20 MG tablet Take 1 tablet by mouth daily          No current facility-administered medications for this visit.              Allergies   Allergen Reactions    Codeine Other (See Comments)      Social History               Socioeconomic 
spontaneous

## 2024-02-15 NOTE — OP NOTE
Operative Note      Patient: Julio C Tipton  YOB: 1950  MRN: [id number]    Date of Procedure: 2/15/2024    Pre-Op Diagnosis Codes:     * Elevated PSA [R97.20]    Post-Op Diagnosis: Same       Procedure(s):  MRI FUSION PROSTATE BIOPSY    Surgeon(s):  Bryant Keane Jr., MD    Assistant:   * No surgical staff found *    Anesthesia: Monitor Anesthesia Care    Estimated Blood Loss (mL): Minimal    Complications: None    Specimens:   ID Type Source Tests Collected by Time Destination   A : LLB Tissue Prostate SURGICAL PATHOLOGY Bryant Keane Jr., MD 2/15/2024 1052    B : LLM Tissue Prostate SURGICAL PATHOLOGY Bryant Keane Jr., MD 2/15/2024 1053    C : LLA Tissue Prostate SURGICAL PATHOLOGY Bryant Keane Jr., MD 2/15/2024 1053    D : LB Tissue Prostate SURGICAL PATHOLOGY Bryant Keane Jr., MD 2/15/2024 1053    E : LM Tissue Prostate SURGICAL PATHOLOGY Bryant Keane Jr., MD 2/15/2024 1053    F : LA Tissue Prostate SURGICAL PATHOLOGY Bryant Keane Jr., MD 2/15/2024 1053    G : RB Tissue Prostate SURGICAL PATHOLOGY Bryant Keane Jr., MD 2/15/2024 1053    H : RM Tissue Prostate SURGICAL PATHOLOGY Bryant Keane Jr., MD 2/15/2024 1053    I : RA Tissue Prostate SURGICAL PATHOLOGY Bryant Keane Jr., MD 2/15/2024 1054    J : RLB Tissue Prostate SURGICAL PATHOLOGY Bryant Keane Jr., MD 2/15/2024 1054    K : RLM Tissue Prostate SURGICAL PATHOLOGY Bryant Keane Jr., MD 2/15/2024 1055    L : RLA Tissue Prostate SURGICAL PATHOLOGY Bryant Keane Jr., MD 2/15/2024 1055    M : ANDREA 1 - RA Tissue Prostate SURGICAL PATHOLOGY Bryant Keane Jr., MD 2/15/2024 1140        Implants:  * No implants in log *      Drains: * No LDAs found *    Findings: see op note        Detailed Description of Procedure:   Operative Note                Patient: Julio C Tipton, [id number]    Date of Surgery: 02/15/24    Preoperative Diagnosis: Elevated psa and prostate lesion(s) on MRI imaging    Postoperative

## 2024-02-15 NOTE — DISCHARGE INSTRUCTIONS
If you have had surgery in the past 7-10 days by one of our providers and are having fever, bleeding, or drainage from an incision, have an opening in an incision, or having issues urinating properly, please call [phone].    Prostate Biopsy and Ultrasound:   A prostate biopsy is a type of test. Your doctor takes small tissue samples from your prostate gland. Then another doctor looks at the tissue under a microscope to see if there are cancer cells.  This test is done by a doctor who specializes in men's genital and urinary problems (urologist). It can be done in your doctor's office, a day surgery clinic, or a hospital operating room. To get the tissue samples from the prostate, the doctor inserts a thin needle through the rectum, the urethra, or the area between the anus and scrotum (perineum). The most common method is through the rectum. Your doctor may use ultrasound to help guide the needle.  What else should you know about this test?  A prostate biopsy has a slight risk of causing problems such as infection or bleeding.  If the biopsy went through your rectum, you may have a small amount of bleeding from your rectum for 2 to 3 days after the biopsy.  You may have a little pain in your pelvic area. You may also have a little blood in your urine for 1 to 5 days.  You may have some blood in your semen for a week or longer.  Do not do heavy work or exercise for 4 hours after the test.  Your doctor will tell you how long it may take to get your results back.  Follow-up care is a key part of your treatment and safety. Be sure to make and go to all appointments, and call your doctor if you are having problems. It's also a good idea to keep a list of the medicines you take. Ask your doctor when you can expect to have your test results.    After general anesthesia or intravenous sedation, for 24 hours or while taking prescription Narcotics:  Limit your activities  A responsible adult needs to be with you for the  next 24 hours  Do not drive and operate hazardous machinery  Do not make important personal or business decisions  Do not drink alcoholic beverages  If you have not urinated within 8 hours after discharge, and you are experiencing discomfort from urinary retention, please go to the nearest ED.  If you have sleep apnea and have a CPAP machine, please use it for all naps and sleeping.  Please use caution when taking narcotics and any of your home medications that may cause drowsiness.  *  Please give a list of your current medications to your Primary Care Provider.  *  Please update this list whenever your medications are discontinued, doses are      changed, or new medications (including over-the-counter products) are added.  *  Please carry medication information at all times in case of emergency situations.    These are general instructions for a healthy lifestyle:  No smoking/ No tobacco products/ Avoid exposure to second hand smoke  Surgeon General's Warning:  Quitting smoking now greatly reduces serious risk to your health.  Obesity, smoking, and sedentary lifestyle greatly increases your risk for illness  A healthy diet, regular physical exercise & weight monitoring are important for maintaining a healthy lifestyle    You may be retaining fluid if you have a history of heart failure or if you experience any of the following symptoms:  Weight gain of 3 pounds or more overnight or 5 pounds in a week, increased swelling in our hands or feet or shortness of breath while lying flat in bed.  Please call your doctor as soon as you notice any of these symptoms; do not wait until your next office visit.

## 2024-02-15 NOTE — ANESTHESIA POSTPROCEDURE EVALUATION
Department of Anesthesiology  Postprocedure Note    Patient: Julio C Tipton  MRN: [id number]  YOB: 1950  Date of evaluation: 2/15/2024    Procedure Summary       Date: 02/15/24 Room / Location: [facility] MAIN OR 03 / [facility] MAIN OR    Anesthesia Start: 1127 Anesthesia Stop: 1153    Procedure: MRI FUSION PROSTATE BIOPSY (Rectum) Diagnosis:       Elevated PSA      (Elevated PSA [R97.20])    Providers: Bryant Keane Jr., MD Responsible Provider: MADHAVI Washburn MD    Anesthesia Type: TIVA ASA Status: 2            Anesthesia Type: TIVA    Gadiel Phase I: Gadiel Score: 9    Gadiel Phase II:      Anesthesia Post Evaluation    Patient location during evaluation: PACU  Patient participation: complete - patient participated  Level of consciousness: awake and alert  Airway patency: patent  Nausea & Vomiting: no nausea and no vomiting  Cardiovascular status: hemodynamically stable  Respiratory status: acceptable  Hydration status: euvolemic  Comments: Blood pressure 115/67, pulse 67, temperature 97.1 °F (36.2 °C), temperature source Temporal, resp. rate 16, height 1.778 m (5' 10\"), weight 82.1 kg (181 lb), SpO2 100 %.    No apparent anesthetic complications.  Pt stable for discharge from PACU  Multimodal analgesia pain management approach  Pain management: adequate    No notable events documented.

## 2024-03-04 ENCOUNTER — OFFICE VISIT (OUTPATIENT)
Dept: UROLOGY | Age: 74
End: 2024-03-04
Payer: COMMERCIAL

## 2024-03-04 DIAGNOSIS — R97.20 BPH WITH ELEVATED PSA: ICD-10-CM

## 2024-03-04 DIAGNOSIS — R97.20 ELEVATED PROSTATE SPECIFIC ANTIGEN (PSA): Primary | ICD-10-CM

## 2024-03-04 DIAGNOSIS — N41.1 CHRONIC PROSTATITIS: ICD-10-CM

## 2024-03-04 DIAGNOSIS — N40.0 BPH WITH ELEVATED PSA: ICD-10-CM

## 2024-03-04 PROCEDURE — G8484 FLU IMMUNIZE NO ADMIN: HCPCS | Performed by: UROLOGY

## 2024-03-04 PROCEDURE — 1036F TOBACCO NON-USER: CPT | Performed by: UROLOGY

## 2024-03-04 PROCEDURE — G8419 CALC BMI OUT NRM PARAM NOF/U: HCPCS | Performed by: UROLOGY

## 2024-03-04 PROCEDURE — 3017F COLORECTAL CA SCREEN DOC REV: CPT | Performed by: UROLOGY

## 2024-03-04 PROCEDURE — 99214 OFFICE O/P EST MOD 30 MIN: CPT | Performed by: UROLOGY

## 2024-03-04 PROCEDURE — G8427 DOCREV CUR MEDS BY ELIG CLIN: HCPCS | Performed by: UROLOGY

## 2024-03-04 PROCEDURE — 1123F ACP DISCUSS/DSCN MKR DOCD: CPT | Performed by: UROLOGY

## 2024-03-04 RX ORDER — SULFAMETHOXAZOLE AND TRIMETHOPRIM 800; 160 MG/1; MG/1
1 TABLET ORAL 2 TIMES DAILY
Qty: 20 TABLET | Refills: 0 | Status: SHIPPED | OUTPATIENT
Start: 2024-03-04 | End: 2024-03-14

## 2024-03-04 ASSESSMENT — ENCOUNTER SYMPTOMS
BACK PAIN: 0
NAUSEA: 0

## 2024-03-04 NOTE — PROGRESS NOTES
[facility]  [address] [place] [id number]  [phone]    Julio C Tipton  : 1950    Chief Complaint   Patient presents with    Follow-up        HPI     Julio C Tipton is a 74 y.o. male with elevated PSA.  The result was a PSA level of 6.21 ng/ml.   \": PSA was 8.98 when repeated in .     Had prostate biopsy 17.   PROSTATE VOLUME: 64.67 gm  PSA 8.98 PSAD 0.14  Pathology:   All biopsies were benign.       Total PSA 13.3 with 13.8% free PSA in .      Had MRI pelvis 17: IMPRESSION:  1. Nonspecific enhancing wedge-shaped decreased T2 intensity lateral right mid  gland without diffusion abnormality. No evidence of disease spread beyond the  prostatic capsule.  2. No lymphadenopathy.   3. Decreased T2 signal surrounding each ejaculatory duct orifice is nonspecific  and may represent sequelae of prior infection.  4. BPH centrally.   prostate approx 75 gm by MRI.      He had some type of reaction after the IV contrast. Felt warm and passed out. No problems breathing .   PCA3 score was 8. Gives 8% chance of positive biopsy.    PSA 9.2 in -.  PSA 7.7 in .  In , total PSA 11.4 with 16.6% free PSA.     Gave round of Cipro.    PSA 9.1 in .  PSA is back to level it was in 2016. We discussed doing repeat biopsies w or wo MRI. He doesn't want to have MRI because of the reaction that he had. I don't think had a true contrast allergy.   PSA 8.3 in .  PSA 5.9 in ,  7.2 in . He has no significant voiding complaints.   In , total PSA 7.4 with 26.4% free PSA.   PSA 9.7 in .  In , total PSA 9.8 with 18.4% free PSA.  PSA elevated , but stable over last 6 months.      In , total PSA 15.0, 24% free PSA.   MRI in :   Findings:   PROSTATE SIZE: The gland measures 5.9 x 5.5 x 5.4 cm not significantly changed   from previous examination..       PERIPHERAL GLAND: The previously noted right peripheral zone wedge T2

## 2024-05-28 ENCOUNTER — NURSE ONLY (OUTPATIENT)
Dept: UROLOGY | Age: 74
End: 2024-05-28

## 2024-05-28 DIAGNOSIS — N40.0 BPH WITH ELEVATED PSA: ICD-10-CM

## 2024-05-28 DIAGNOSIS — R97.20 BPH WITH ELEVATED PSA: ICD-10-CM

## 2024-05-28 LAB — PSA SERPL-MCNC: 16.1 NG/ML (ref 0–4)

## 2024-06-13 ENCOUNTER — OFFICE VISIT (OUTPATIENT)
Dept: UROLOGY | Age: 74
End: 2024-06-13
Payer: COMMERCIAL

## 2024-06-13 DIAGNOSIS — R97.20 BPH WITH ELEVATED PSA: Primary | ICD-10-CM

## 2024-06-13 DIAGNOSIS — R97.20 ELEVATED PROSTATE SPECIFIC ANTIGEN (PSA): ICD-10-CM

## 2024-06-13 DIAGNOSIS — N41.1 CHRONIC PROSTATITIS: ICD-10-CM

## 2024-06-13 DIAGNOSIS — N40.0 BPH WITH ELEVATED PSA: Primary | ICD-10-CM

## 2024-06-13 LAB
BILIRUBIN, URINE, POC: NEGATIVE
BLOOD URINE, POC: NORMAL
GLUCOSE URINE, POC: NEGATIVE
KETONES, URINE, POC: NORMAL
LEUKOCYTE ESTERASE, URINE, POC: NEGATIVE
NITRITE, URINE, POC: NEGATIVE
PH, URINE, POC: 6 (ref 4.6–8)
PROTEIN,URINE, POC: NEGATIVE
SPECIFIC GRAVITY, URINE, POC: 1.01 (ref 1–1.03)
URINALYSIS CLARITY, POC: NORMAL
URINALYSIS COLOR, POC: NORMAL
UROBILINOGEN, POC: NORMAL

## 2024-06-13 PROCEDURE — 1123F ACP DISCUSS/DSCN MKR DOCD: CPT | Performed by: NURSE PRACTITIONER

## 2024-06-13 PROCEDURE — 99214 OFFICE O/P EST MOD 30 MIN: CPT | Performed by: NURSE PRACTITIONER

## 2024-06-13 PROCEDURE — 3017F COLORECTAL CA SCREEN DOC REV: CPT | Performed by: NURSE PRACTITIONER

## 2024-06-13 PROCEDURE — 81003 URINALYSIS AUTO W/O SCOPE: CPT | Performed by: NURSE PRACTITIONER

## 2024-06-13 PROCEDURE — G8427 DOCREV CUR MEDS BY ELIG CLIN: HCPCS | Performed by: NURSE PRACTITIONER

## 2024-06-13 PROCEDURE — G8419 CALC BMI OUT NRM PARAM NOF/U: HCPCS | Performed by: NURSE PRACTITIONER

## 2024-06-13 PROCEDURE — 1036F TOBACCO NON-USER: CPT | Performed by: NURSE PRACTITIONER

## 2024-06-13 ASSESSMENT — ENCOUNTER SYMPTOMS: BACK PAIN: 0

## 2024-06-13 NOTE — PROGRESS NOTES
[facility]  [address] [place] [address]  [phone]          Julio C Tipton  : 1950    Chief Complaint   Patient presents with    Follow-up     3 mo          HPI     Julio C Tipton is a 74 y.o. male with elevated PSA.  The result was a PSA level of 6.21 ng/ml.   \": PSA was 8.98 when repeated in .     Had prostate biopsy 17.   PROSTATE VOLUME: 64.67 gm  PSA 8.98 PSAD 0.14  Pathology:   All biopsies were benign.       Total PSA 13.3 with 13.8% free PSA in .      Had MRI pelvis 17: IMPRESSION:  1. Nonspecific enhancing wedge-shaped decreased T2 intensity lateral right mid  gland without diffusion abnormality. No evidence of disease spread beyond the  prostatic capsule.  2. No lymphadenopathy.   3. Decreased T2 signal surrounding each ejaculatory duct orifice is nonspecific  and may represent sequelae of prior infection.  4. BPH centrally.   prostate approx 75 gm by MRI.      He had some type of reaction after the IV contrast. Felt warm and passed out. No problems breathing .   PCA3 score was 8. Gives 8% chance of positive biopsy.    PSA 9.2 in -.  PSA 7.7 in .  In , total PSA 11.4 with 16.6% free PSA.     Gave round of Cipro.    PSA 9.1 in .  PSA is back to level it was in 2016. We discussed doing repeat biopsies w or wo MRI. He doesn't want to have MRI because of the reaction that he had. I don't think had a true contrast allergy.   PSA 8.3 in -.  PSA 5.9 in ,  7.2 in . He has no significant voiding complaints.   In , total PSA 7.4 with 26.4% free PSA.   PSA 9.7 in .  In , total PSA 9.8 with 18.4% free PSA.  PSA elevated , but stable over last 6 months.      In , total PSA 15.0, 24% free PSA.   MRI in :   Findings:   PROSTATE SIZE: The gland measures 5.9 x 5.5 x 5.4 cm not significantly changed   from previous examination..       PERIPHERAL GLAND: The previously noted right

## 2024-07-25 ENCOUNTER — TELEPHONE (OUTPATIENT)
Dept: UROLOGY | Age: 74
End: 2024-07-25

## 2024-07-25 DIAGNOSIS — N41.1 CHRONIC PROSTATITIS: Primary | ICD-10-CM

## 2024-07-25 RX ORDER — SULFAMETHOXAZOLE AND TRIMETHOPRIM 800; 160 MG/1; MG/1
1 TABLET ORAL 2 TIMES DAILY
Qty: 14 TABLET | Refills: 0 | Status: SHIPPED | OUTPATIENT
Start: 2024-07-25 | End: 2024-08-01

## 2024-07-25 NOTE — TELEPHONE ENCOUNTER
----- Message from Joanne Beck RN sent at 7/23/2024  9:45 AM EDT -----  Regarding: FW: Prostatitis symptoms   Contact: [phone]    ----- Message -----  From: Julio C Tipton  Sent: 7/22/2024   4:50 PM EDT  To: #  Subject: Prostatitis symptoms                             Dr. Keane, I’m currently experiencing a slight warmth, not quite a burning, sensation along with increased daytime urination frequency. This is very similar to the symptoms I had years ago with prostatitis. No fever present. Is this something I should see you for or is there a medication I could take to alleviate this? I understand that my recent diagnosis was chronic prostatitis. I’m not scheduled to see you again until September. Thank you, Michael Tipton

## 2024-07-25 NOTE — TELEPHONE ENCOUNTER
Reached out to patient, he has picked the prescription up and will let us know if he has an issues

## 2024-09-10 ENCOUNTER — LAB (OUTPATIENT)
Dept: UROLOGY | Age: 74
End: 2024-09-10

## 2024-09-10 DIAGNOSIS — N40.0 BPH WITH ELEVATED PSA: ICD-10-CM

## 2024-09-10 DIAGNOSIS — R97.20 BPH WITH ELEVATED PSA: ICD-10-CM

## 2024-09-10 DIAGNOSIS — R97.20 ELEVATED PROSTATE SPECIFIC ANTIGEN (PSA): ICD-10-CM

## 2024-09-10 DIAGNOSIS — N41.1 CHRONIC PROSTATITIS: ICD-10-CM

## 2024-09-10 LAB — PSA SERPL-MCNC: 21.7 NG/ML (ref 0–4)

## 2024-09-16 ENCOUNTER — OFFICE VISIT (OUTPATIENT)
Dept: UROLOGY | Age: 74
End: 2024-09-16
Payer: COMMERCIAL

## 2024-09-16 DIAGNOSIS — N41.1 CHRONIC PROSTATITIS: Primary | ICD-10-CM

## 2024-09-16 DIAGNOSIS — R97.20 BPH WITH ELEVATED PSA: ICD-10-CM

## 2024-09-16 DIAGNOSIS — R97.20 ELEVATED PSA: ICD-10-CM

## 2024-09-16 DIAGNOSIS — N40.0 BPH WITH ELEVATED PSA: ICD-10-CM

## 2024-09-16 DIAGNOSIS — R97.20 ELEVATED PROSTATE SPECIFIC ANTIGEN (PSA): ICD-10-CM

## 2024-09-16 LAB
BILIRUBIN, URINE, POC: NEGATIVE
BLOOD URINE, POC: NORMAL
GLUCOSE URINE, POC: NEGATIVE
KETONES, URINE, POC: NEGATIVE
LEUKOCYTE ESTERASE, URINE, POC: NEGATIVE
NITRITE, URINE, POC: NEGATIVE
PH, URINE, POC: 6.5 (ref 4.6–8)
PROTEIN,URINE, POC: NEGATIVE
SPECIFIC GRAVITY, URINE, POC: <=1.005 (ref 1–1.03)
URINALYSIS CLARITY, POC: NORMAL
URINALYSIS COLOR, POC: NORMAL
UROBILINOGEN, POC: NORMAL

## 2024-09-16 PROCEDURE — 1036F TOBACCO NON-USER: CPT | Performed by: UROLOGY

## 2024-09-16 PROCEDURE — 3017F COLORECTAL CA SCREEN DOC REV: CPT | Performed by: UROLOGY

## 2024-09-16 PROCEDURE — G8427 DOCREV CUR MEDS BY ELIG CLIN: HCPCS | Performed by: UROLOGY

## 2024-09-16 PROCEDURE — 1123F ACP DISCUSS/DSCN MKR DOCD: CPT | Performed by: UROLOGY

## 2024-09-16 PROCEDURE — 81003 URINALYSIS AUTO W/O SCOPE: CPT | Performed by: UROLOGY

## 2024-09-16 PROCEDURE — 99214 OFFICE O/P EST MOD 30 MIN: CPT | Performed by: UROLOGY

## 2024-09-16 PROCEDURE — G8419 CALC BMI OUT NRM PARAM NOF/U: HCPCS | Performed by: UROLOGY

## 2024-09-16 RX ORDER — CIPROFLOXACIN 500 MG/1
500 TABLET, FILM COATED ORAL 2 TIMES DAILY
Qty: 20 TABLET | Refills: 0 | Status: SHIPPED | OUTPATIENT
Start: 2024-09-16 | End: 2024-09-26

## 2024-09-16 ASSESSMENT — ENCOUNTER SYMPTOMS
BACK PAIN: 0
NAUSEA: 0

## 2024-09-17 ENCOUNTER — TELEPHONE (OUTPATIENT)
Dept: UROLOGY | Age: 74
End: 2024-09-17

## 2024-10-21 ENCOUNTER — LAB (OUTPATIENT)
Dept: UROLOGY | Age: 74
End: 2024-10-21

## 2024-10-21 DIAGNOSIS — R97.20 BPH WITH ELEVATED PSA: Primary | ICD-10-CM

## 2024-10-21 DIAGNOSIS — N40.0 BPH WITH ELEVATED PSA: Primary | ICD-10-CM

## 2024-10-21 DIAGNOSIS — N40.0 BPH WITH ELEVATED PSA: ICD-10-CM

## 2024-10-21 DIAGNOSIS — R97.20 BPH WITH ELEVATED PSA: ICD-10-CM

## 2024-10-21 LAB — PSA SERPL-MCNC: 20.6 NG/ML (ref 0–4)

## 2024-10-28 ENCOUNTER — OFFICE VISIT (OUTPATIENT)
Dept: UROLOGY | Age: 74
End: 2024-10-28
Payer: COMMERCIAL

## 2024-10-28 DIAGNOSIS — N41.1 CHRONIC PROSTATITIS: Primary | ICD-10-CM

## 2024-10-28 DIAGNOSIS — N40.0 BPH WITH ELEVATED PSA: ICD-10-CM

## 2024-10-28 DIAGNOSIS — R97.20 BPH WITH ELEVATED PSA: ICD-10-CM

## 2024-10-28 DIAGNOSIS — R97.20 ELEVATED PROSTATE SPECIFIC ANTIGEN (PSA): ICD-10-CM

## 2024-10-28 LAB
BILIRUBIN, URINE, POC: NEGATIVE
BLOOD URINE, POC: NORMAL
GLUCOSE URINE, POC: NEGATIVE MG/DL
KETONES, URINE, POC: NEGATIVE MG/DL
LEUKOCYTE ESTERASE, URINE, POC: NEGATIVE
NITRITE, URINE, POC: NEGATIVE
PH, URINE, POC: 7 (ref 4.6–8)
PROTEIN,URINE, POC: NEGATIVE MG/DL
SPECIFIC GRAVITY, URINE, POC: 1.01 (ref 1–1.03)
URINALYSIS CLARITY, POC: NORMAL
URINALYSIS COLOR, POC: NORMAL
UROBILINOGEN, POC: NORMAL MG/DL

## 2024-10-28 PROCEDURE — G8419 CALC BMI OUT NRM PARAM NOF/U: HCPCS | Performed by: UROLOGY

## 2024-10-28 PROCEDURE — G8484 FLU IMMUNIZE NO ADMIN: HCPCS | Performed by: UROLOGY

## 2024-10-28 PROCEDURE — 3017F COLORECTAL CA SCREEN DOC REV: CPT | Performed by: UROLOGY

## 2024-10-28 PROCEDURE — 81003 URINALYSIS AUTO W/O SCOPE: CPT | Performed by: UROLOGY

## 2024-10-28 PROCEDURE — G8427 DOCREV CUR MEDS BY ELIG CLIN: HCPCS | Performed by: UROLOGY

## 2024-10-28 PROCEDURE — 1036F TOBACCO NON-USER: CPT | Performed by: UROLOGY

## 2024-10-28 PROCEDURE — 99213 OFFICE O/P EST LOW 20 MIN: CPT | Performed by: UROLOGY

## 2024-10-28 PROCEDURE — 1123F ACP DISCUSS/DSCN MKR DOCD: CPT | Performed by: UROLOGY

## 2024-10-28 ASSESSMENT — ENCOUNTER SYMPTOMS
NAUSEA: 0
BACK PAIN: 0

## 2024-10-28 NOTE — PROGRESS NOTES
Elevated PSA     Hypertension      Past Surgical History:   Procedure Laterality Date    LUMBAR LAMINECTOMY  2004    PROSTATE BIOPSY N/A 2/15/2024    MRI FUSION PROSTATE BIOPSY performed by Bryant Keane Jr., MD at [facility] MAIN OR     Current Outpatient Medications   Medication Sig Dispense Refill    losartan-hydroCHLOROthiazide (HYZAAR) 100-25 MG per tablet Take 0.5 tablets by mouth daily TAKE 1 TABLET BY MOUTH EVERY DAY      meloxicam (MOBIC) 15 MG tablet Take 1 tablet by mouth daily PRN       No current facility-administered medications for this visit.     Allergies   Allergen Reactions    Codeine Itching     Social History     Socioeconomic History    Marital status:      Spouse name: Not on file    Number of children: Not on file    Years of education: Not on file    Highest education level: Not on file   Occupational History    Not on file   Tobacco Use    Smoking status: Never    Smokeless tobacco: Never   Vaping Use    Vaping status: Never Used   Substance and Sexual Activity    Alcohol use: No    Drug use: No    Sexual activity: Yes     Partners: Female     Comment:  to wife 42 years   Other Topics Concern    Not on file   Social History Narrative    Not on file     Social Determinants of Health     Financial Resource Strain: Not on file   Food Insecurity: Not on file   Transportation Needs: Not on file   Physical Activity: Not on file   Stress: Not on file   Social Connections: Not on file   Intimate Partner Violence: Not on file   Housing Stability: Not on file     Family History   Problem Relation Age of Onset    Cancer Paternal Aunt        Review of Systems  Constitutional:   Negative for fever.  GI:  Negative for nausea.  Musculoskeletal:  Negative for back pain.      There were no vitals taken for this visit.    Urinalysis  UA - Dipstick  Results for orders placed or performed in visit on 10/28/24   AMB POC URINALYSIS DIP STICK AUTO W/O MICRO   Result Value Ref Range    Color (UA POC)

## 2025-01-23 ENCOUNTER — LAB (OUTPATIENT)
Dept: UROLOGY | Age: 75
End: 2025-01-23

## 2025-01-23 DIAGNOSIS — R97.20 BPH WITH ELEVATED PSA: ICD-10-CM

## 2025-01-23 DIAGNOSIS — N40.0 BPH WITH ELEVATED PSA: ICD-10-CM

## 2025-01-23 LAB — PSA SERPL-MCNC: 23 NG/ML (ref 0–4)

## 2025-01-31 ENCOUNTER — OFFICE VISIT (OUTPATIENT)
Dept: UROLOGY | Age: 75
End: 2025-01-31
Payer: COMMERCIAL

## 2025-01-31 DIAGNOSIS — R97.20 ELEVATED PSA: ICD-10-CM

## 2025-01-31 DIAGNOSIS — N41.1 CHRONIC PROSTATITIS: Primary | ICD-10-CM

## 2025-01-31 DIAGNOSIS — N40.0 BPH WITH ELEVATED PSA: ICD-10-CM

## 2025-01-31 DIAGNOSIS — R97.20 BPH WITH ELEVATED PSA: ICD-10-CM

## 2025-01-31 PROBLEM — E78.5 HYPERLIPIDEMIA: Status: ACTIVE | Noted: 2018-10-01

## 2025-01-31 PROBLEM — D22.9 NEVUS: Status: ACTIVE | Noted: 2017-12-14

## 2025-01-31 PROBLEM — R68.82 LOW LIBIDO: Status: ACTIVE | Noted: 2018-10-01

## 2025-01-31 PROBLEM — E66.3 OVERWEIGHT WITH BODY MASS INDEX (BMI) 25.0-29.9: Status: ACTIVE | Noted: 2018-12-17

## 2025-01-31 PROBLEM — M25.561 RIGHT KNEE PAIN: Status: ACTIVE | Noted: 2022-01-07

## 2025-01-31 PROBLEM — I10 BENIGN ESSENTIAL HTN: Status: ACTIVE | Noted: 2017-12-14

## 2025-01-31 LAB
BILIRUBIN, URINE, POC: NEGATIVE
BLOOD URINE, POC: NORMAL
GLUCOSE URINE, POC: NEGATIVE MG/DL
KETONES, URINE, POC: NEGATIVE MG/DL
LEUKOCYTE ESTERASE, URINE, POC: NEGATIVE
NITRITE, URINE, POC: NEGATIVE
PH, URINE, POC: 6 (ref 4.6–8)
PROTEIN,URINE, POC: NEGATIVE MG/DL
SPECIFIC GRAVITY, URINE, POC: 1.01 (ref 1–1.03)
URINALYSIS CLARITY, POC: NORMAL
URINALYSIS COLOR, POC: NORMAL
UROBILINOGEN, POC: NORMAL MG/DL

## 2025-01-31 PROCEDURE — 1036F TOBACCO NON-USER: CPT | Performed by: UROLOGY

## 2025-01-31 PROCEDURE — 3017F COLORECTAL CA SCREEN DOC REV: CPT | Performed by: UROLOGY

## 2025-01-31 PROCEDURE — 81003 URINALYSIS AUTO W/O SCOPE: CPT | Performed by: UROLOGY

## 2025-01-31 PROCEDURE — G8427 DOCREV CUR MEDS BY ELIG CLIN: HCPCS | Performed by: UROLOGY

## 2025-01-31 PROCEDURE — 1123F ACP DISCUSS/DSCN MKR DOCD: CPT | Performed by: UROLOGY

## 2025-01-31 PROCEDURE — G8421 BMI NOT CALCULATED: HCPCS | Performed by: UROLOGY

## 2025-01-31 PROCEDURE — 99213 OFFICE O/P EST LOW 20 MIN: CPT | Performed by: UROLOGY

## 2025-01-31 ASSESSMENT — ENCOUNTER SYMPTOMS
BACK PAIN: 0
COUGH: 0

## 2025-01-31 NOTE — PROGRESS NOTES
[facility]  [address] [place] [id number]  [phone]    Julio C Tipton  : 1950    Chief Complaint   Patient presents with    Follow-up        HPI     Julio C Tipton is a 74 y.o. male   with elevated PSA.  The result was a PSA level of 6.21 ng/ml.   \": PSA was 8.98 when repeated in .     Had prostate biopsy 17.   PROSTATE VOLUME: 64.67 gm  PSA 8.98 PSAD 0.14  Pathology:   All biopsies were benign.       Total PSA 13.3 with 13.8% free PSA in .      Had MRI pelvis 17: IMPRESSION:  1. Nonspecific enhancing wedge-shaped decreased T2 intensity lateral right mid  gland without diffusion abnormality. No evidence of disease spread beyond the  prostatic capsule.  2. No lymphadenopathy.   3. Decreased T2 signal surrounding each ejaculatory duct orifice is nonspecific  and may represent sequelae of prior infection.  4. BPH centrally.   prostate approx 75 gm by MRI.      He had some type of reaction after the IV contrast. Felt warm and passed out. No problems breathing .   PCA3 score was 8. Gives 8% chance of positive biopsy.    PSA 9.2 in -.  PSA 7.7 in .  In , total PSA 11.4 with 16.6% free PSA.     Gave round of Cipro.    PSA 9.1 in .  PSA is back to level it was in 2016. We discussed doing repeat biopsies w or wo MRI. He doesn't want to have MRI because of the reaction that he had. I don't think had a true contrast allergy.   PSA 8.3 in .  PSA 5.9 in ,  7.2 in . He has no significant voiding complaints.   In , total PSA 7.4 with 26.4% free PSA.   PSA 9.7 in .  In , total PSA 9.8 with 18.4% free PSA.  PSA elevated , but stable over last 6 months.      In , total PSA 15.0, 24% free PSA.   MRI in :   Findings:   PROSTATE SIZE: The gland measures 5.9 x 5.5 x 5.4 cm not significantly changed   from previous examination..       PERIPHERAL GLAND: The previously noted right peripheral zone wedge T2

## 2025-02-12 ENCOUNTER — HOSPITAL ENCOUNTER (OUTPATIENT)
Dept: MRI IMAGING | Age: 75
Discharge: HOME | End: 2025-02-15
Attending: UROLOGY
Payer: COMMERCIAL

## 2025-02-12 DIAGNOSIS — R97.20 ELEVATED PSA: ICD-10-CM

## 2025-02-12 PROCEDURE — 72197 MRI PELVIS W/O & W/DYE: CPT

## 2025-02-12 PROCEDURE — A9579 GAD-BASE MR CONTRAST NOS,1ML: HCPCS | Performed by: UROLOGY

## 2025-02-12 PROCEDURE — 6360000004 HC RX CONTRAST MEDICATION: Performed by: UROLOGY

## 2025-02-12 RX ADMIN — GADOTERIDOL 17 ML: 279.3 INJECTION, SOLUTION INTRAVENOUS at 07:47

## 2025-02-14 ENCOUNTER — TELEPHONE (OUTPATIENT)
Dept: UROLOGY | Age: 75
End: 2025-02-14

## 2025-05-12 ENCOUNTER — LAB (OUTPATIENT)
Dept: UROLOGY | Age: 75
End: 2025-05-12

## 2025-05-12 DIAGNOSIS — R97.20 ELEVATED PSA: ICD-10-CM

## 2025-05-12 LAB — PSA SERPL-MCNC: 26.1 NG/ML (ref 0–4)

## 2025-05-19 ENCOUNTER — OFFICE VISIT (OUTPATIENT)
Dept: UROLOGY | Age: 75
End: 2025-05-19
Payer: COMMERCIAL

## 2025-05-19 DIAGNOSIS — N40.0 BPH WITH ELEVATED PSA: Primary | ICD-10-CM

## 2025-05-19 DIAGNOSIS — R97.20 ELEVATED PSA: ICD-10-CM

## 2025-05-19 DIAGNOSIS — R97.20 BPH WITH ELEVATED PSA: Primary | ICD-10-CM

## 2025-05-19 LAB
BILIRUBIN, URINE, POC: NEGATIVE
BLOOD URINE, POC: NORMAL
GLUCOSE URINE, POC: NEGATIVE MG/DL
KETONES, URINE, POC: NEGATIVE MG/DL
LEUKOCYTE ESTERASE, URINE, POC: NEGATIVE
NITRITE, URINE, POC: NEGATIVE
PH, URINE, POC: 6.5 (ref 4.6–8)
PROTEIN,URINE, POC: NEGATIVE MG/DL
SPECIFIC GRAVITY, URINE, POC: 1.01 (ref 1–1.03)
URINALYSIS CLARITY, POC: CLEAR
URINALYSIS COLOR, POC: YELLOW
UROBILINOGEN, POC: NORMAL MG/DL

## 2025-05-19 PROCEDURE — 81003 URINALYSIS AUTO W/O SCOPE: CPT | Performed by: UROLOGY

## 2025-05-19 PROCEDURE — 1123F ACP DISCUSS/DSCN MKR DOCD: CPT | Performed by: UROLOGY

## 2025-05-19 PROCEDURE — 1159F MED LIST DOCD IN RCRD: CPT | Performed by: UROLOGY

## 2025-05-19 PROCEDURE — 99213 OFFICE O/P EST LOW 20 MIN: CPT | Performed by: UROLOGY

## 2025-05-19 NOTE — PROGRESS NOTES
10-24.  Exo dx score 22.4.  PSA elevated but stable. Exo dx score is 22, which is fairly low   PSA 23 in 1-25.  MRI 2-12-25: FINDINGS:     - PROSTATE: 5.5 x 5.1 x 5.5 cm. Volume is 80 mL.     - PERIPHERAL ZONE: There is increased relative atrophy of the right peripheral  zone. The area of low signal noted in the right peripheral zone on the prior  exam is less prominent. No residual discrete mass or restricted diffusion.     - TRANSITION ZONE: Moderate hypertrophic changes.  No significant discrete mass.     - SEMINAL VESICLES: Unremarkable.     - PELVIC METASTATIC DISEASE: No suspicious lymphadenopathy or bony lesions.     - SIGNIFICANT ADDITIONAL FINDINGS: None.     IMPRESSION:  Moderate hypertrophic change. No significant discrete prostate mass.  PI-RADS 2.  PSA 26.1 in 5-25.  He has no sxs of dysuria or hematuria.     Past Medical History:   Diagnosis Date    Elevated PSA     Hypertension      Past Surgical History:   Procedure Laterality Date    LUMBAR LAMINECTOMY  2004    PROSTATE BIOPSY N/A 2/15/2024    MRI FUSION PROSTATE BIOPSY performed by Bryant Keane Jr., MD at [facility] MAIN OR     Current Outpatient Medications   Medication Sig Dispense Refill    losartan-hydroCHLOROthiazide (HYZAAR) 100-25 MG per tablet Take 0.5 tablets by mouth daily TAKE 1 TABLET BY MOUTH EVERY DAY       No current facility-administered medications for this visit.     Allergies   Allergen Reactions    Codeine Itching     Social History     Socioeconomic History    Marital status:      Spouse name: Not on file    Number of children: Not on file    Years of education: Not on file    Highest education level: Not on file   Occupational History    Not on file   Tobacco Use    Smoking status: Never     Passive exposure: Never    Smokeless tobacco: Never   Vaping Use    Vaping status: Never Used   Substance and Sexual Activity    Alcohol use: No    Drug use: No    Sexual activity: Yes     Partners: Female     Comment:  to wife

## 2025-06-17 ENCOUNTER — TELEPHONE (OUTPATIENT)
Age: 75
End: 2025-06-17

## 2025-06-17 ENCOUNTER — TELEPHONE (OUTPATIENT)
Dept: UROLOGY | Age: 75
End: 2025-06-17

## 2025-06-17 DIAGNOSIS — R97.20 ELEVATED PSA: Primary | ICD-10-CM

## 2025-06-17 NOTE — TELEPHONE ENCOUNTER
Presented case in cancer conference this AM. On most recent MRI, was felt to have a suspicious area in the anterior prostate.   Recommendation was for transperineal prostate biopsy.   I called pt and he is agreeable.   Please make referral to Dr. Erickson for discussion. Pt is on vacation in [facility] and will be back on 6-21.  I told him to expect a call.

## 2025-06-17 NOTE — TELEPHONE ENCOUNTER
Dr. Keane is referring this patient for a transperineal prostate biopsy.  He was discussed at [facility] today and has an elevated PSA that has gone up to the mid 20s.  He has had multiple prior negative TRUS biopsies.  He appears to have a PI-RADS 4 lesion in the very anterior part of his prostate on the left side.  These images were reviewed in detail at [facility].  All agree on recommendation for repeat biopsy.  He will see me in the office to discuss and arrange this.

## 2025-06-25 NOTE — PROGRESS NOTES
Urologic Oncology  [facility]emat[facility] & Oncology  [address] [place] [id number]  [phone]          Julio C Tipton  : 1950      INITIAL EVALUATION    Chief Complaint   Patient presents with    New Patient       HPI:     Julio C Tipton is a 75 y.o. male with a elevated PSA.  He has been followed by Dr. Keane for several years.  He had a rising PSA from 9.8 on 2020 22-15.0 on 2022.  Since that time it is continued to rise to most recently 26.1 on 2025.  He has had several prior prostate biopsies all of which were benign or positive for chronic prostatitis.  No malignancy identified.      He had MRI prostate with and without contrast 2025 that demonstrated prostate measuring approximately 80 cc with no lesions greater than PI-RADS 2 per report.  He was discussed at [facility] last week and imaging reviewed and there was felt to be a left, far anterior PI-RADS 4 lesion of concern and transperineal prostate biopsy was recommended.    Patient denies any significant lower urinary tract symptoms.  No dysuria or hematuria.  He gets up approximately 1 time per night to void.    Patient denies family history of prostate cancer or other malignancies.    Patient's only other medical comorbidity includes hypertension.  He states that he has not established with a new primary care provider and has been decreasing his losartan to extend current prescription.  He also states that he has previously been managed with statin for high cholesterol which he has run out of and has continued off this medication.  He is not on any blood thinners or anticoagulation.        PMH:     Past Medical History:   Diagnosis Date    Elevated PSA     Hypertension        PSH:    Past Surgical History:   Procedure Laterality Date    LUMBAR LAMINECTOMY      PROSTATE BIOPSY N/A 2/15/2024    MRI FUSION PROSTATE BIOPSY performed by Bryant Keane Jr., MD at [facility] MAIN OR       MEDs:    Current

## 2025-06-26 NOTE — PROGRESS NOTES
NEW PATIENT ABSTRACT      Referral Diagnosis: Elevated PSA    Referring Provider: Bryant Keane Jr., MD  Date of Referral: 6-20-25    Primary Care Provider: Salvador Soler MD    Presenting Symptoms: chronic prostatitis     Family History of Cancer: Cancer-related family history includes Cancer in his paternal aunt.    Past Medical History:   Past Medical History:   Diagnosis Date    Elevated PSA     Hypertension        Background Information:     9-13-17: MRI Pelvis  IMPRESSION:  1. Nonspecific enhancing wedge-shaped decreased T2 intensity lateral right mid gland without diffusion abnormality. No evidence of disease spread beyond the prostatic capsule.  2. No lymphadenopathy.   3. Decreased T2 signal surrounding each ejaculatory duct orifice is nonspecific and may represent sequelae of prior infection.  4. BPH centrally.    1-5-23: MRI Pelvis  IMPRESSION:  1. No discernible lesion seen. The capsule is unremarkable.  2. No lymphadenopathy.    12-25-23: MRI Pelvis  IMPRESSION:  Lesion 1: Right apex peripheral zone, 1.3 cm.  BPH   RECOMMENDATION:  Prostate biopsy for tissue sampling.    2-15-24: Prostate Biopsy  DIAGNOSIS       A:  \"PROSTATE, LEFT LATERAL BASE, BIOPSY\":              BENIGN PROSTATE TISSUE.          B:  \"PROSTATE, LEFT LATERAL MID, BIOPSY\":              BENIGN PROSTATE TISSUE.          C:  \"PROSTATE, LEFT LATERAL APEX, BIOPSY\":              BENIGN PROSTATE TISSUE, CHRONIC PROSTATITIS.          D:  \"PROSTATE, LEFT BASE, BIOPSY\":              BENIGN PROSTATE TISSUE.          E:  \"PROSTATE, LEFT MID, BIOPSY\":              BENIGN PROSTATE TISSUE, CHRONIC PROSTATITIS.          F:  \"PROSTATE, LEFT APEX, BIOPSY\":              BENIGN PROSTATE TISSUE, CHRONIC PROSTATITIS.          G:  \"PROSTATE, RIGHT BASE, BIOPSY\":              BENIGN PROSTATE TISSUE.          H:  \"PROSTATE, RIGHT MID, BIOPSY\":              BENIGN PROSTATE TISSUE, CHRONIC PROSTATITIS.          I:  \"PROSTATE, RIGHT APEX, BIOPSY\":

## 2025-06-30 ENCOUNTER — OFFICE VISIT (OUTPATIENT)
Age: 75
End: 2025-06-30
Payer: COMMERCIAL

## 2025-06-30 ENCOUNTER — PREP FOR PROCEDURE (OUTPATIENT)
Dept: ONCOLOGY | Age: 75
End: 2025-06-30

## 2025-06-30 VITALS
HEIGHT: 70 IN | RESPIRATION RATE: 18 BRPM | WEIGHT: 176 LBS | OXYGEN SATURATION: 100 % | HEART RATE: 56 BPM | BODY MASS INDEX: 25.2 KG/M2 | SYSTOLIC BLOOD PRESSURE: 157 MMHG | DIASTOLIC BLOOD PRESSURE: 80 MMHG

## 2025-06-30 DIAGNOSIS — R97.20 ELEVATED PSA: Primary | ICD-10-CM

## 2025-06-30 PROCEDURE — 99204 OFFICE O/P NEW MOD 45 MIN: CPT | Performed by: PHYSICIAN ASSISTANT

## 2025-06-30 PROCEDURE — 1123F ACP DISCUSS/DSCN MKR DOCD: CPT | Performed by: PHYSICIAN ASSISTANT

## 2025-06-30 PROCEDURE — 3079F DIAST BP 80-89 MM HG: CPT | Performed by: PHYSICIAN ASSISTANT

## 2025-06-30 PROCEDURE — 3077F SYST BP >= 140 MM HG: CPT | Performed by: PHYSICIAN ASSISTANT

## 2025-06-30 ASSESSMENT — ENCOUNTER SYMPTOMS
GASTROINTESTINAL NEGATIVE: 1
RESPIRATORY NEGATIVE: 1

## 2025-06-30 NOTE — PATIENT INSTRUCTIONS
Patient Information from Today's Visit    The members of your [facility] are listed below:    Physician Provider: Allan Erickson, Urologic Oncologist  Advanced Practice Clinician: UDAY Armijo  Medical Assistant: Lyndsay ANG CMA  :Vane ANN  Supportive Care Services: Pam HENRY LMSW    Diagnosis (Information Sheet Provided on Day of Diagnosis): Elevated PSA    Follow Up Instructions:   Records reviewed   Proceed with transperineal prostate biopsy. If you need anything prior to the procedure please do not hesitate to call our office.        We reviewed with the patient the significance of an elevated PSA.  We discussed benign etiologies such as; benign enlargement of the prostate, inflammation of the prostate, infections of the prostate, and prostatic manipulation as a possible cause. We also discussed the possibility of prostate cancer. The only way to really differentiate this would be a biopsy of the prostate. The most common way to diagnose prostate cancer is an ultrasound-guided biopsy of the prostate with our without MRI fusion.  Alternatively, we could repeat the PSA, do a free/total PSA, or just manage the patient with observation. We discussed the risks inherent in a biopsy including but not limited to; bleeding, infection, urinary retention, and pain associated with the procedure.  We recommend the patient stop any aspirin or nonsteroidal anti-inflammatories or other blood thinners approximately 5 days prior to the procedure. If the patient is taking any of these medications for heart issues or other specific reasons, he needs to consult with the prescribing doctor before stopping treatment.  We explained that we give him prophylactic antibiotic(s) around the time of the procedure to help prevent serious infectious complications.  We also discussed concerns with over-treatment of prostate cancer and that the main purpose of the biopsy is to hopefully rule out high-grade

## 2025-07-01 NOTE — PERIOP NOTE
Patient verified name and .  Order for consent NOT found in EHR at time of PAT visit. Unable to verify case posting against order; surgery verified by patient.    Type 1A surgery, phone assessment complete.  Orders not received.  Labs per surgeon: none ordered  Labs per anesthesia protocol: not indicated    Patient answered medical/surgical history questions at their best of ability. All prior to admission medications documented in EPIC. Patient stated \"prescription ran out for the BP pill.\"      Patient instructed on the following:    > Surgery Location: [address] [place] [address] Entrance   > Time of arrival for surgery: A nurse will call you by 5:00 pm the business day before your surgery      to tell you the time you should arrive. Your arrival time may be different than your surgery time. If there is no      answer; the nurse will leave you a voicemail. If you have not received a phone call and do not have a voicemail     by 5:00 pm the day before your surgery please call Main Pre-op: [phone].    > No food after midnight, patient may drink clear liquids up until 2 hours prior to arrival. No gum, candy, mints.   > Responsible adult must drive patient to the hospital, stay during surgery, and patient will need supervision 24 hours after anesthesia  > Use non moisturizing soap in shower the night before surgery and on the morning of surgery  > All piercings must be removed prior to arrival.    > Leave all valuables (money and jewelry) at home but bring insurance card and ID on day of surgery.   > You may be required to pay a deductible or co-pay on the day of your procedure. You can pre-pay by calling [phone] if your surgery is at the   [facility] or [phone] if your surgery is at the [facility].  > Do not wear make-up, nail polish, lotions, cologne, perfumes, powders, or oil on skin. Artificial nails are not permitted.     PLEASE CONTINUE TAKING ALL PRESCRIPTION

## 2025-07-06 NOTE — TELEPHONE ENCOUNTER
Diagnosis Orders   1. Chronic prostatitis  sulfamethoxazole-trimethoprim (BACTRIM DS) 800-160 MG per tablet        I eprescribed the med  Let us know after treatment if he is no better.    Independent CHARLEEN Visit.     [facility]  Department of Emergency Medicine   ED  Encounter Note  Admit Date/RoomTime: 2025  1:18 PM  ED Room: MS4/MS4      NAME: Lv Julien  : 2004  MRN: [id number]     Chief Complaint:  Pharyngitis (Patient c/o sore throat radiating into ear also states he has white spots on throat )    History of Present Illness      Lv Julien is a 20 y.o. old male who presents to the emergency department by private vehicle, for sudden onset of bilateral sore throat pain, which occured 1 day(s) prior to arrival. Associated Signs & Symptoms: bilateral ear pain, subjective fever, chills and headache Since onset the symptoms have been remaining constant. He is drinking plenty of fluids. There has been no N/V/D, chest pain or SOB. Taking Ibuprofen for the symptoms with minimal relief. No known sick contacts.           Exposed To:  Streptococcus: no.                              Infectious Mononucleosis:  unknown.        Symptoms:  Pain:  Yes.                            Muffled Voice:  No.                            Hoarse:  No.                            Difficulty with Secretions:  No.    Centor Score (MODIFIED) For Strep Pharyngitis:    Age 3-14 Years   no (0)     Age >45 Years   NO     Exudate or Swelling on Tonsils   yes (1)     Tender/Swollen Anterior Cervical Nodes   yes (1)     Fever? (T > 38°C, 100.4°F)   yes (1)     Absence of Cough   yes (1)   TOTAL POINTS   4   Score of Zero = Probability of Strep Pharyngitis: 1% - 2.5%.,   No further testing nor antibiotics.  Score of 1 = Probability of Strep Pharyngitis: 5% - 10%.,   No further testing nor antibiotics.  Score of 2 = Probability of Strep Phar: 11% - 17%.   Culture/test all, ATB's only for positive culture results.  Score of 3 = Probability of Strep Phar: 28% - 35%.   Culture/test all, ATB's only for positive culture results  Score of 4 or 5 = Probability of Strep Pharyngitis: 51% - 53%.     Treat

## 2025-07-17 ENCOUNTER — ANESTHESIA EVENT (OUTPATIENT)
Dept: SURGERY | Age: 75
End: 2025-07-17
Payer: COMMERCIAL

## 2025-07-17 ENCOUNTER — ANESTHESIA (OUTPATIENT)
Dept: SURGERY | Age: 75
End: 2025-07-17
Payer: COMMERCIAL

## 2025-07-17 ENCOUNTER — HOSPITAL ENCOUNTER (OUTPATIENT)
Age: 75
Setting detail: OUTPATIENT SURGERY
Discharge: HOME | End: 2025-07-17
Attending: UROLOGY | Admitting: UROLOGY
Payer: COMMERCIAL

## 2025-07-17 VITALS
SYSTOLIC BLOOD PRESSURE: 130 MMHG | RESPIRATION RATE: 14 BRPM | OXYGEN SATURATION: 99 % | HEART RATE: 52 BPM | TEMPERATURE: 97.8 F | DIASTOLIC BLOOD PRESSURE: 73 MMHG | HEIGHT: 70 IN | WEIGHT: 174.6 LBS | BODY MASS INDEX: 25 KG/M2

## 2025-07-17 PROCEDURE — 3700000001 HC ADD 15 MINUTES (ANESTHESIA): Performed by: UROLOGY

## 2025-07-17 PROCEDURE — 6360000002 HC RX W HCPCS: Performed by: PHYSICIAN ASSISTANT

## 2025-07-17 PROCEDURE — 88305 TISSUE EXAM BY PATHOLOGIST: CPT

## 2025-07-17 PROCEDURE — 7100000000 HC PACU RECOVERY - FIRST 15 MIN: Performed by: UROLOGY

## 2025-07-17 PROCEDURE — 3600000013 HC SURGERY LEVEL 3 ADDTL 15MIN: Performed by: UROLOGY

## 2025-07-17 PROCEDURE — 7100000010 HC PHASE II RECOVERY - FIRST 15 MIN: Performed by: UROLOGY

## 2025-07-17 PROCEDURE — 88344 IMHCHEM/IMCYTCHM EA MLT ANTB: CPT

## 2025-07-17 PROCEDURE — 2580000003 HC RX 258: Performed by: ANESTHESIOLOGY

## 2025-07-17 PROCEDURE — 2709999900 HC NON-CHARGEABLE SUPPLY: Performed by: UROLOGY

## 2025-07-17 PROCEDURE — 7100000011 HC PHASE II RECOVERY - ADDTL 15 MIN: Performed by: UROLOGY

## 2025-07-17 PROCEDURE — 6360000002 HC RX W HCPCS: Performed by: NURSE ANESTHETIST, CERTIFIED REGISTERED

## 2025-07-17 PROCEDURE — 3600000003 HC SURGERY LEVEL 3 BASE: Performed by: UROLOGY

## 2025-07-17 PROCEDURE — 6360000002 HC RX W HCPCS: Performed by: UROLOGY

## 2025-07-17 PROCEDURE — 3700000000 HC ANESTHESIA ATTENDED CARE: Performed by: UROLOGY

## 2025-07-17 RX ORDER — ACETAMINOPHEN 325 MG/1
650 TABLET ORAL
Status: DISCONTINUED | OUTPATIENT
Start: 2025-07-17 | End: 2025-07-17 | Stop reason: HOSPADM

## 2025-07-17 RX ORDER — TRAMADOL HYDROCHLORIDE 50 MG/1
50 TABLET ORAL
Status: DISCONTINUED | OUTPATIENT
Start: 2025-07-17 | End: 2025-07-17 | Stop reason: HOSPADM

## 2025-07-17 RX ORDER — SODIUM CHLORIDE 0.9 % (FLUSH) 0.9 %
5-40 SYRINGE (ML) INJECTION PRN
Status: DISCONTINUED | OUTPATIENT
Start: 2025-07-17 | End: 2025-07-17 | Stop reason: HOSPADM

## 2025-07-17 RX ORDER — HALOPERIDOL 5 MG/ML
1 INJECTION INTRAMUSCULAR
Status: DISCONTINUED | OUTPATIENT
Start: 2025-07-17 | End: 2025-07-17 | Stop reason: HOSPADM

## 2025-07-17 RX ORDER — LIDOCAINE HYDROCHLORIDE 10 MG/ML
INJECTION, SOLUTION INFILTRATION; PERINEURAL PRN
Status: DISCONTINUED | OUTPATIENT
Start: 2025-07-17 | End: 2025-07-17 | Stop reason: ALTCHOICE

## 2025-07-17 RX ORDER — LIDOCAINE HYDROCHLORIDE 10 MG/ML
1 INJECTION, SOLUTION INFILTRATION; PERINEURAL
Status: DISCONTINUED | OUTPATIENT
Start: 2025-07-17 | End: 2025-07-17 | Stop reason: HOSPADM

## 2025-07-17 RX ORDER — SODIUM CHLORIDE 0.9 % (FLUSH) 0.9 %
5-40 SYRINGE (ML) INJECTION EVERY 12 HOURS SCHEDULED
Status: DISCONTINUED | OUTPATIENT
Start: 2025-07-17 | End: 2025-07-17 | Stop reason: HOSPADM

## 2025-07-17 RX ORDER — IPRATROPIUM BROMIDE AND ALBUTEROL SULFATE 2.5; .5 MG/3ML; MG/3ML
1 SOLUTION RESPIRATORY (INHALATION)
Status: DISCONTINUED | OUTPATIENT
Start: 2025-07-17 | End: 2025-07-17 | Stop reason: HOSPADM

## 2025-07-17 RX ORDER — LIDOCAINE HYDROCHLORIDE 20 MG/ML
INJECTION, SOLUTION EPIDURAL; INFILTRATION; INTRACAUDAL; PERINEURAL
Status: DISCONTINUED | OUTPATIENT
Start: 2025-07-17 | End: 2025-07-17 | Stop reason: SDUPTHER

## 2025-07-17 RX ORDER — DEXTROSE MONOHYDRATE 100 MG/ML
INJECTION, SOLUTION INTRAVENOUS CONTINUOUS PRN
Status: DISCONTINUED | OUTPATIENT
Start: 2025-07-17 | End: 2025-07-17 | Stop reason: HOSPADM

## 2025-07-17 RX ORDER — IBUPROFEN 600 MG/1
1 TABLET ORAL PRN
Status: DISCONTINUED | OUTPATIENT
Start: 2025-07-17 | End: 2025-07-17 | Stop reason: HOSPADM

## 2025-07-17 RX ORDER — ACETAMINOPHEN 500 MG
1000 TABLET ORAL ONCE
Status: DISCONTINUED | OUTPATIENT
Start: 2025-07-17 | End: 2025-07-17 | Stop reason: HOSPADM

## 2025-07-17 RX ORDER — SODIUM CHLORIDE 9 MG/ML
INJECTION, SOLUTION INTRAVENOUS PRN
Status: DISCONTINUED | OUTPATIENT
Start: 2025-07-17 | End: 2025-07-17 | Stop reason: HOSPADM

## 2025-07-17 RX ORDER — LABETALOL HYDROCHLORIDE 5 MG/ML
10 INJECTION, SOLUTION INTRAVENOUS
Status: DISCONTINUED | OUTPATIENT
Start: 2025-07-17 | End: 2025-07-17 | Stop reason: HOSPADM

## 2025-07-17 RX ORDER — HYDRALAZINE HYDROCHLORIDE 20 MG/ML
10 INJECTION INTRAMUSCULAR; INTRAVENOUS
Status: DISCONTINUED | OUTPATIENT
Start: 2025-07-17 | End: 2025-07-17 | Stop reason: HOSPADM

## 2025-07-17 RX ORDER — PROPOFOL 10 MG/ML
INJECTION, EMULSION INTRAVENOUS
Status: DISCONTINUED | OUTPATIENT
Start: 2025-07-17 | End: 2025-07-17 | Stop reason: SDUPTHER

## 2025-07-17 RX ORDER — METOCLOPRAMIDE HYDROCHLORIDE 5 MG/ML
10 INJECTION INTRAMUSCULAR; INTRAVENOUS
Status: DISCONTINUED | OUTPATIENT
Start: 2025-07-17 | End: 2025-07-17 | Stop reason: HOSPADM

## 2025-07-17 RX ORDER — DIPHENHYDRAMINE HYDROCHLORIDE 50 MG/ML
12.5 INJECTION, SOLUTION INTRAMUSCULAR; INTRAVENOUS
Status: DISCONTINUED | OUTPATIENT
Start: 2025-07-17 | End: 2025-07-17 | Stop reason: HOSPADM

## 2025-07-17 RX ORDER — FENTANYL CITRATE 50 UG/ML
25 INJECTION, SOLUTION INTRAMUSCULAR; INTRAVENOUS EVERY 5 MIN PRN
Status: DISCONTINUED | OUTPATIENT
Start: 2025-07-17 | End: 2025-07-17 | Stop reason: HOSPADM

## 2025-07-17 RX ORDER — BUPIVACAINE HYDROCHLORIDE 2.5 MG/ML
INJECTION, SOLUTION EPIDURAL; INFILTRATION; INTRACAUDAL; PERINEURAL PRN
Status: DISCONTINUED | OUTPATIENT
Start: 2025-07-17 | End: 2025-07-17 | Stop reason: ALTCHOICE

## 2025-07-17 RX ORDER — SODIUM CHLORIDE, SODIUM LACTATE, POTASSIUM CHLORIDE, CALCIUM CHLORIDE 600; 310; 30; 20 MG/100ML; MG/100ML; MG/100ML; MG/100ML
INJECTION, SOLUTION INTRAVENOUS CONTINUOUS
Status: DISCONTINUED | OUTPATIENT
Start: 2025-07-17 | End: 2025-07-17 | Stop reason: HOSPADM

## 2025-07-17 RX ADMIN — PROPOFOL 30 MG: 10 INJECTION, EMULSION INTRAVENOUS at 11:58

## 2025-07-17 RX ADMIN — PROPOFOL 30 MG: 10 INJECTION, EMULSION INTRAVENOUS at 12:07

## 2025-07-17 RX ADMIN — Medication 2000 MG: at 11:47

## 2025-07-17 RX ADMIN — LIDOCAINE HYDROCHLORIDE 30 MG: 20 INJECTION, SOLUTION EPIDURAL; INFILTRATION; INTRACAUDAL; PERINEURAL at 11:56

## 2025-07-17 RX ADMIN — PROPOFOL 30 MG: 10 INJECTION, EMULSION INTRAVENOUS at 12:03

## 2025-07-17 RX ADMIN — PROPOFOL 30 MG: 10 INJECTION, EMULSION INTRAVENOUS at 12:01

## 2025-07-17 RX ADMIN — PROPOFOL 60 MG: 10 INJECTION, EMULSION INTRAVENOUS at 11:56

## 2025-07-17 RX ADMIN — SODIUM CHLORIDE, SODIUM LACTATE, POTASSIUM CHLORIDE, AND CALCIUM CHLORIDE: .6; .31; .03; .02 INJECTION, SOLUTION INTRAVENOUS at 10:54

## 2025-07-17 ASSESSMENT — ENCOUNTER SYMPTOMS
GASTROINTESTINAL NEGATIVE: 1
RESPIRATORY NEGATIVE: 1

## 2025-07-17 ASSESSMENT — PAIN - FUNCTIONAL ASSESSMENT
PAIN_FUNCTIONAL_ASSESSMENT: NONE - DENIES PAIN
PAIN_FUNCTIONAL_ASSESSMENT: NONE - DENIES PAIN
PAIN_FUNCTIONAL_ASSESSMENT: 0-10
PAIN_FUNCTIONAL_ASSESSMENT: NONE - DENIES PAIN

## 2025-07-17 NOTE — OP NOTE
Patient: Julio C Tipton, [id number]    Date of Surgery: 07/17/25    Preoperative Diagnosis: Elevated PSA    Postoperative Diagnosis:  same    Surgeon(s) and Role:     * Frankie Erickson MD - Primary     Anesthesia:  MAC     Procedure: Procedure(s):  URONAV MRI Fusion Transperineal Ultrasound-Guided Prostate Biopsy     Indications:     Discussed the risk of surgery including infection, hematoma, bleeding, urinary retention, pain, and the risks of anesthethesia.  The patient understands the risks, any and all questions were answered to the patient's satisfaction and signed the consent for operation.     URONAV MRI FUSED TRANSPERINEAL ULTRASOUND GUIDED BIOPSY OF THE PROSTATE    Of note only 1 region of interest was marked by the radiologist.  It was the right PZ.  When we removed viewed his MRI and TPC there was an area of concern in the left very anterior transition zone.  I targeted that via cognitive fusion.    All risks, benefits and alternatives were again reviewed and he is willing to proceed at this time.  The patient was placed in low lithotomy.   Ancef was given as a prophylactic antibiotic.  Chloroprep was used to prep the perineal area.  Approximately 4-5 cc of 0.25% marcaine mixed equally with 1% lidocaine plain was injected under the perineal skin at the needle insertion sites. I then inserted the transrectal ultrasound probe into the rectum.  The prostate was visualized.  The prostate appeared homogenous in appearance.   Ultrasonographic sweep of the prostate was performed to obtain images to link to MRI via URONAV.  There were two region of interest ( 1. Right PZ; 2. Left TZ ) based upon MRI imaging.  3 biopsies of region of interest were then obtained using the ultrasound images for guidance that had been linked to the previous MRI using Uronav.  I then performed 10 needle core biopsies using a standard transperineal biopsy format with traditional ultrasound images for guidance.  The ultrasound  probe was removed.  The patient tolerated the procedure well.      PROSTATE VOLUME:  94 cc    YOBANI: No induration or nodule -- just bph changes    Estimated Blood Loss:  minimal    Specimens: prostate biopsies     ID Type Source Tests Collected by Time Destination   A : RPM Tissue Prostate SURGICAL PATHOLOGY Allan Erickson MD 7/17/2025 1050    B : RPL Tissue Prostate SURGICAL PATHOLOGY Allan Erickson MD 7/17/2025 1050    C : RB Tissue Prostate SURGICAL PATHOLOGY Allan Erickson MD 7/17/2025 1050    D : SHIN Tissue Prostate SURGICAL PATHOLOGY Allan Erickson MD 7/17/2025 1050    E : RAL Tissue Prostate SURGICAL PATHOLOGY Allan Erickson MD 7/17/2025 1050    F : LPM Tissue Prostate SURGICAL PATHOLOGY Allan Erickson MD 7/17/2025 1050    G : LPL Tissue Prostate SURGICAL PATHOLOGY Allan Erickson MD 7/17/2025 1050    H : LB Tissue Prostate SURGICAL PATHOLOGY Allan Erickson MD 7/17/2025 1050    I : ANDERSON Tissue Prostate SURGICAL PATHOLOGY Allan Erickson MD 7/17/2025 1050    J : ALE Tissue Prostate SURGICAL PATHOLOGY Allan Erickson MD 7/17/2025 1050    K : ANDREA #1 RIGHT PZ Tissue Prostate SURGICAL PATHOLOGY Allan Erickson MD 7/17/2025 1050    L : ADNREA #2 LEFT TZ Tissue Prostate SURGICAL PATHOLOGY Allan Erickson MD 7/17/2025 1200                Drains: none                 Implants: * No implants in log *    Will SARIKA Erickson    [facility]  [facility]

## 2025-07-17 NOTE — ANESTHESIA POSTPROCEDURE EVALUATION
Department of Anesthesiology  Postprocedure Note    Patient: Julio C Tipton  MRN: [id number]  YOB: 1950  Date of evaluation: 7/17/2025    Procedure Summary       Date: 07/17/25 Room / Location: [facility] MAIN OR 03 / [facility] MAIN OR    Anesthesia Start: 1147 Anesthesia Stop: 1216    Procedure: MRI FUSION GUIDED TRANSPERINEAL PROSTATE BIOPSY (Perineum) Diagnosis:       Elevated PSA      (Elevated PSA [R97.20])    Providers: Allan Erickson MD Responsible Provider: Duarte Vieira MD    Anesthesia Type: TIVA ASA Status: 2            Anesthesia Type: No value filed.    Gadiel Phase I: Gadiel Score: 8    Gadiel Phase II:      Anesthesia Post Evaluation    Patient location during evaluation: bedside  Patient participation: complete - patient participated  Level of consciousness: awake and sleepy but conscious  Pain scale: Controlled.  Airway patency: patent  Nausea & Vomiting: no nausea and no vomiting  Cardiovascular status: blood pressure returned to baseline  Respiratory status: acceptable  Hydration status: euvolemic  Pain management: adequate    No notable events documented.

## 2025-07-17 NOTE — H&P
H&P Update:    The patient's last History and Physical was reviewed, and I examined the patient today.  There are no changes.  The surgical procedure and site were confirmed by the patient and me.  We reviewed his case at [facility].  He does appear to have an abnormality in the left anterior portion of his prostate on MRI.  We are mary target that area via a transperineal biopsy.  He is a patient of Dr. Keane who was referred to me.    PE:  NAD  Heart- Reg, normal perfusion  Pulmonary- clear, normal respiratory effort  Abdomen- Soft, ND     Plan: The risks, benefits, and alternative treatment options were again discussed with the patient.  The patient understands and wants to proceed with the procedure--MRI fusion transperineal prostate biopsy.       Urologic Oncology  [facility]ology [facility] Oncology  [address] [place], [place] [id number] [phone]          Julio C Tipton  : 1950      INITIAL EVALUATION    No chief complaint on file.      HPI:     Julio C Tipton is a 75 y.o. male with a elevated PSA.  He has been followed by Dr. Keane for several years.  He had a rising PSA from 9.8 on 2020 22-15.0 on 2022.  Since that time it is continued to rise to most recently 26.1 on 2025.  He has had several prior prostate biopsies all of which were benign or positive for chronic prostatitis.  No malignancy identified.      He had MRI prostate with and without contrast 2025 that demonstrated prostate measuring approximately 80 cc with no lesions greater than PI-RADS 2 per report.  He was discussed at [facility] last week and imaging reviewed and there was felt to be a left, far anterior PI-RADS 4 lesion of concern and transperineal prostate biopsy was recommended.    Patient denies any significant lower urinary tract symptoms.  No dysuria or hematuria.  He gets up approximately 1 time per night to void.    Patient denies family history of prostate cancer or other

## 2025-07-17 NOTE — PERIOP NOTE
Discharge instructions reviewed with patient and wife, Leyla, who verbalize understanding of follow up care.

## 2025-07-17 NOTE — ANESTHESIA PRE PROCEDURE
Department of Anesthesiology  Preprocedure Note       Name:  Julio C Tipton   Age:  75 y.o.  :  1950                                          MRN:  [id number]         Date:  2025      Surgeon: Surgeon(s):  Allan Erickson MD    Procedure: Procedure(s):  MRI FUSION GUIDED TRANSPERINEAL PROSTATE BIOPSY    Medications prior to admission:   Prior to Admission medications    Medication Sig Start Date End Date Taking? Authorizing Provider   losartan-hydroCHLOROthiazide (HYZAAR) 100-25 MG per tablet Take 0.5 tablets by mouth daily TAKE 1 TABLET BY MOUTH EVERY DAY  Patient not taking: Reported on 2025 10/3/20   Automatic Reconciliation, Ar       Current medications:    Current Facility-Administered Medications   Medication Dose Route Frequency Provider Last Rate Last Admin   • lidocaine 1 % injection 1 mL  1 mL IntraDERmal Once PRN Duarte Vieira MD       • acetaminophen (TYLENOL) tablet 1,000 mg  1,000 mg Oral Once Duarte Vieira MD       • lactated ringers infusion   IntraVENous Continuous Duarte Vieira  mL/hr at 25 1054 New Bag at 25 1054   • sodium chloride flush 0.9 % injection 5-40 mL  5-40 mL IntraVENous 2 times per day Duarte Vieira MD       • sodium chloride flush 0.9 % injection 5-40 mL  5-40 mL IntraVENous PRN Duarte Vieira MD       • 0.9 % sodium chloride infusion   IntraVENous PRN Duarte Vieira MD       • ceFAZolin (ANCEF) 2000 mg in sterile water 20 mL IV syringe  2,000 mg IntraVENous On Call to OR Tammy Finnegan PA           Allergies:    Allergies   Allergen Reactions   • Codeine Itching       Problem List:    Patient Active Problem List   Diagnosis Code   • Elevated PSA R97.20   • Benign prostatic hyperplasia without lower urinary tract symptoms N40.0   • Benign essential HTN I10   • Hyperlipidemia E78.5   • Low libido R68.82   • Nevus D22.9   • Overweight with body mass index (BMI) 25.0-29.9 E66.3   • Right knee pain M25.561       Past

## 2025-07-17 NOTE — DISCHARGE INSTRUCTIONS
My office will schedule your follow-up appointment.    Please call our office ([phone]) or return to ED if you experience fever > 101.5, severe pain, persistent nausea/vomiting, excessive bleeding, inability to urinate, or other concerns.       If you have had surgery in the past 7-10 days by one of our providers and are having fever, bleeding, or drainage from an incision, have an opening in an incision, or having issues urinating properly, please call [phone] during normal office hours. Please call [phone] for any immediate needs AFTER HOURS.     Prostate Biopsy and Ultrasound:   A prostate biopsy is a type of test. Your doctor takes small tissue samples from your prostate gland. Then another doctor looks at the tissue under a microscope to see if there are cancer cells.  This test is done by a doctor who specializes in men's genital and urinary problems (urologist). It can be done in your doctor's office, a day surgery clinic, or a hospital operating room. To get the tissue samples from the prostate, the doctor inserts a thin needle through the area between the anus and scrotum (perineum).  Your doctor will likely use transrectal ultrasound to help guide the needle.    What else should you know about this test?  A prostate biopsy has a slight risk of causing problems such as infection or bleeding or urinary retention.  You may have a little pain in your pelvic area. You may also have a little blood in your urine for 1 to 5 days.  You may have some blood in your semen for 1-2 months  Do not do heavy work or exercise for 4 hours after the procedure.  Your doctor will tell you how long it may take to get your results back.      Follow-up care is a key part of your treatment and safety. Be sure to make and go to all appointments, and call your doctor if you are having problems. It's also a good idea to keep a list of the medicines you take. Ask your doctor when you can expect to have your test  results.    After general anesthesia or intravenous sedation, for 24 hours or while taking prescription Narcotics:  Limit your activities  A responsible adult needs to be with you for the next 24 hours  Do not drive and operate hazardous machinery  Do not make important personal or business decisions  Do not drink alcoholic beverages  If you have not urinated within 8 hours after discharge, and you are experiencing discomfort from urinary retention, please go to the nearest ED.  If you have sleep apnea and have a CPAP machine, please use it for all naps and sleeping.  Please use caution if taking narcotics and any of your home medications that may cause drowsiness.  *  Please give a list of your current medications to your Primary Care Provider.  *  Please update this list whenever your medications are discontinued, doses are      changed, or new medications (including over-the-counter products) are added.  *  Please carry medication information at all times in case of emergency situations.    These are general instructions for a healthy lifestyle:  No smoking/ No tobacco products/ Avoid exposure to second hand smoke  Surgeon General's Warning:  Quitting smoking now greatly reduces serious risk to your health.  Obesity, smoking, and sedentary lifestyle greatly increases your risk for illness  A healthy diet, regular physical exercise & weight monitoring are important for maintaining a healthy lifestyle

## 2025-08-04 ENCOUNTER — OFFICE VISIT (OUTPATIENT)
Age: 75
End: 2025-08-04
Payer: COMMERCIAL

## 2025-08-04 VITALS
DIASTOLIC BLOOD PRESSURE: 74 MMHG | WEIGHT: 178.2 LBS | SYSTOLIC BLOOD PRESSURE: 159 MMHG | RESPIRATION RATE: 18 BRPM | OXYGEN SATURATION: 99 % | BODY MASS INDEX: 25.51 KG/M2 | HEIGHT: 70 IN | HEART RATE: 55 BPM

## 2025-08-04 DIAGNOSIS — R97.20 ELEVATED PSA: Primary | ICD-10-CM

## 2025-08-04 PROCEDURE — 1123F ACP DISCUSS/DSCN MKR DOCD: CPT | Performed by: UROLOGY

## 2025-08-04 PROCEDURE — 3077F SYST BP >= 140 MM HG: CPT | Performed by: UROLOGY

## 2025-08-04 PROCEDURE — 99213 OFFICE O/P EST LOW 20 MIN: CPT | Performed by: UROLOGY

## 2025-08-04 PROCEDURE — 3078F DIAST BP <80 MM HG: CPT | Performed by: UROLOGY

## 2025-08-04 ASSESSMENT — ENCOUNTER SYMPTOMS
RESPIRATORY NEGATIVE: 1
GASTROINTESTINAL NEGATIVE: 1

## 2025-08-04 ASSESSMENT — PATIENT HEALTH QUESTIONNAIRE - PHQ9
2. FEELING DOWN, DEPRESSED OR HOPELESS: NOT AT ALL
SUM OF ALL RESPONSES TO PHQ QUESTIONS 1-9: 0
1. LITTLE INTEREST OR PLEASURE IN DOING THINGS: NOT AT ALL
SUM OF ALL RESPONSES TO PHQ QUESTIONS 1-9: 0

## (undated) DEVICE — COVER PRB W1XL11.8IN ENDOCAVITY CLR E BND NEOGUARD

## (undated) DEVICE — MAX-CORE® DISPOSABLE CORE BIOPSY INSTRUMENT, 18G X 25CM: Brand: MAX-CORE

## (undated) DEVICE — STERILE PACKED. BORE DIAMETER 1.6 MM; ANGLE OF INSERTION 19° TO THE LONG AXIS OF THE TRANSDUCER: Brand: SINGLE-USE BIPLANE BIOPSY GUIDE

## (undated) DEVICE — JELLY LUBRICATING 10GM PREFIL SYR LUBE

## (undated) DEVICE — NEEDLE SYRINGE 18GA L1.5IN RED PLAS HUB S STL BLNT FILL W/O

## (undated) DEVICE — APPLICATOR MEDICATED 10.5 CC SOLUTION HI LT ORNG CHLORAPREP

## (undated) DEVICE — KIT BX PROST 20ML VI PREFIL W 10ML 10% NEUT BUFF FRMLN LEAK

## (undated) DEVICE — GLOVE SURG SZ 8 CRM LTX FREE POLYISOPRENE POLYMER BEAD ANTI

## (undated) DEVICE — JELLY,LUBE,STERILE,FLIP TOP,TUBE,4-OZ: Brand: MEDLINE

## (undated) DEVICE — SYRINGE MED 5ML STD CLR PLAS LUERLOCK TIP N CTRL DISP

## (undated) DEVICE — SOLUTION IRRIG 1000ML H2O PIC PLAS SHATTERPROOF CONTAINER

## (undated) DEVICE — TOWEL,OR,DSP,ST,NATURAL,DLX,4/PK,20PK/CS: Brand: MEDLINE

## (undated) DEVICE — DRAPE TWL SURG 16X26IN BLU ORB04] ALLCARE INC]

## (undated) DEVICE — STERILE (2.0 X 30CM) COVER: Brand: NEOGUARD™ TRANSDUCER COVER

## (undated) DEVICE — NEEDLE HYPO 21GA L1.5IN INTRAMUSCULAR S STL LATCH BVL UP

## (undated) DEVICE — HOLDER PRB URONAV

## (undated) DEVICE — MARKER,UTILITY,W/RULER & LABELS: Brand: MEDLINE